# Patient Record
Sex: FEMALE | Race: WHITE | NOT HISPANIC OR LATINO | Employment: FULL TIME | URBAN - METROPOLITAN AREA
[De-identification: names, ages, dates, MRNs, and addresses within clinical notes are randomized per-mention and may not be internally consistent; named-entity substitution may affect disease eponyms.]

---

## 2017-01-18 ENCOUNTER — COMMUNICATION - HEALTHEAST (OUTPATIENT)
Dept: FAMILY MEDICINE | Facility: CLINIC | Age: 32
End: 2017-01-18

## 2017-04-14 ENCOUNTER — COMMUNICATION - HEALTHEAST (OUTPATIENT)
Dept: FAMILY MEDICINE | Facility: CLINIC | Age: 32
End: 2017-04-14

## 2017-04-17 ENCOUNTER — OFFICE VISIT - HEALTHEAST (OUTPATIENT)
Dept: FAMILY MEDICINE | Facility: CLINIC | Age: 32
End: 2017-04-17

## 2017-04-17 DIAGNOSIS — J20.9 ACUTE BRONCHITIS: ICD-10-CM

## 2017-04-17 ASSESSMENT — MIFFLIN-ST. JEOR: SCORE: 1719.2

## 2018-03-16 ENCOUNTER — COMMUNICATION - HEALTHEAST (OUTPATIENT)
Dept: FAMILY MEDICINE | Facility: CLINIC | Age: 33
End: 2018-03-16

## 2018-04-04 ENCOUNTER — OFFICE VISIT - HEALTHEAST (OUTPATIENT)
Dept: FAMILY MEDICINE | Facility: CLINIC | Age: 33
End: 2018-04-04

## 2018-04-04 DIAGNOSIS — G43.909 MIGRAINE HEADACHE: ICD-10-CM

## 2018-04-04 DIAGNOSIS — Z00.00 ROUTINE GENERAL MEDICAL EXAMINATION AT A HEALTH CARE FACILITY: ICD-10-CM

## 2018-04-04 DIAGNOSIS — E66.9 OBESITY: ICD-10-CM

## 2018-04-04 LAB
ALBUMIN SERPL-MCNC: 3.4 G/DL (ref 3.5–5)
ALP SERPL-CCNC: 83 U/L (ref 45–120)
ALT SERPL W P-5'-P-CCNC: 11 U/L (ref 0–45)
ANION GAP SERPL CALCULATED.3IONS-SCNC: 15 MMOL/L (ref 5–18)
AST SERPL W P-5'-P-CCNC: 15 U/L (ref 0–40)
BILIRUB SERPL-MCNC: 0.3 MG/DL (ref 0–1)
BUN SERPL-MCNC: 10 MG/DL (ref 8–22)
CALCIUM SERPL-MCNC: 9.7 MG/DL (ref 8.5–10.5)
CHLORIDE BLD-SCNC: 105 MMOL/L (ref 98–107)
CHOLEST SERPL-MCNC: 190 MG/DL
CO2 SERPL-SCNC: 18 MMOL/L (ref 22–31)
CREAT SERPL-MCNC: 0.62 MG/DL (ref 0.6–1.1)
FASTING STATUS PATIENT QL REPORTED: YES
GFR SERPL CREATININE-BSD FRML MDRD: >60 ML/MIN/1.73M2
GLUCOSE BLD-MCNC: 84 MG/DL (ref 70–125)
HDLC SERPL-MCNC: 61 MG/DL
LDLC SERPL CALC-MCNC: 78 MG/DL
POTASSIUM BLD-SCNC: 4.3 MMOL/L (ref 3.5–5)
PROT SERPL-MCNC: 7.1 G/DL (ref 6–8)
SODIUM SERPL-SCNC: 138 MMOL/L (ref 136–145)
TRIGL SERPL-MCNC: 256 MG/DL
TSH SERPL DL<=0.005 MIU/L-ACNC: 3.49 UIU/ML (ref 0.3–5)

## 2018-04-04 ASSESSMENT — MIFFLIN-ST. JEOR: SCORE: 1684.05

## 2018-04-05 LAB
HPV SOURCE: ABNORMAL
HUMAN PAPILLOMA VIRUS 16 DNA: NEGATIVE
HUMAN PAPILLOMA VIRUS 18 DNA: NEGATIVE
HUMAN PAPILLOMA VIRUS FINAL DIAGNOSIS: ABNORMAL
HUMAN PAPILLOMA VIRUS OTHER HR: POSITIVE
SPECIMEN DESCRIPTION: ABNORMAL

## 2018-04-24 ENCOUNTER — COMMUNICATION - HEALTHEAST (OUTPATIENT)
Dept: FAMILY MEDICINE | Facility: CLINIC | Age: 33
End: 2018-04-24

## 2018-06-06 ENCOUNTER — COMMUNICATION - HEALTHEAST (OUTPATIENT)
Dept: FAMILY MEDICINE | Facility: CLINIC | Age: 33
End: 2018-06-06

## 2018-09-06 ENCOUNTER — OFFICE VISIT (OUTPATIENT)
Dept: URGENT CARE | Facility: URGENT CARE | Age: 33
End: 2018-09-06
Payer: COMMERCIAL

## 2018-09-06 VITALS
TEMPERATURE: 98.6 F | HEART RATE: 68 BPM | BODY MASS INDEX: 39.5 KG/M2 | SYSTOLIC BLOOD PRESSURE: 122 MMHG | DIASTOLIC BLOOD PRESSURE: 60 MMHG | RESPIRATION RATE: 18 BRPM | WEIGHT: 223 LBS

## 2018-09-06 DIAGNOSIS — H61.22 EXCESSIVE CERUMEN IN LEFT EAR CANAL: ICD-10-CM

## 2018-09-06 DIAGNOSIS — H92.02 LEFT EAR PAIN: Primary | ICD-10-CM

## 2018-09-06 PROCEDURE — 69209 REMOVE IMPACTED EAR WAX UNI: CPT | Mod: LT | Performed by: NURSE PRACTITIONER

## 2018-09-06 PROCEDURE — 99202 OFFICE O/P NEW SF 15 MIN: CPT | Mod: 25 | Performed by: NURSE PRACTITIONER

## 2018-09-06 RX ORDER — DROSPIRENONE AND ETHINYL ESTRADIOL 0.02-3(28)
1 KIT ORAL
COMMUNITY
Start: 2018-06-07 | End: 2021-08-28

## 2018-09-06 NOTE — MR AVS SNAPSHOT
After Visit Summary   9/6/2018    Mei Kim    MRN: [id number]           Patient Information     Date Of Birth          1985        Visit Information        Provider Department      9/6/2018 7:15 PM Monika Valente APRN CNP [facility]        Today's Diagnoses     Left ear pain    -  1    Excessive cerumen in left ear canal          Care Instructions    Get Debrox or equivalent and use these drops tonight and tomorrow and then return tomorrow for ear irrigation.    Follow-up with your primary care provider next week and as needed.    Indications for emergent return to emergency department discussed with patient, who verbalized good understanding and agreement.  Patient understands the limitations of today's evaluation.         Impacted Earwax     Inner ear structures including ear canal and eardrum.     Impacted earwax is a buildup of the natural wax in the ear (cerumen). Impacted earwax is very common. It can cause symptoms such as hearing loss. It can also make it difficult for a doctor to examine your ear.  Understanding earwax  Tiny glands in your ear make substances that combine with dead skin cells to form earwax. Earwax helps protect your ear canal from water, dirt, infection, and injury. Over time, earwax travels from the inner part of your ear canal to the entrance of the canal. Then it falls away naturally. But in some cases, it can t travel to the entrance of the canal. This may be because of a health condition or objects put in the ear. With age, earwax tends to become harder and less fluid. Older adults are more likely to have problems with earwax buildup.  What causes impacted earwax?  Earwax can build up because of many health conditions. Some cause a physical blockage. Others cause too much earwax to be made. Health conditions that can cause earwax buildup include:    Use of cotton swabs to clean deep in the ear canal    Bony blockage  in the ear (osteoma or exostoses)    Infections, such as  infection of the outer ear (external otitis)    Skin disease, such as eczema    Autoimmune diseases, such as lupus    A narrowed ear canal from birth, chronic inflammation, or injury    Too much earwax because of injury    Too much earwax because of  water in the ear canal  Objects repeatedly placed in the ear can also cause impacted earwax. For example, putting cotton swabs in the ear may push the wax deeper into the ear. Over time, this may cause blockage. Hearing aids, swimming plugs, and swim molds can cause the same problem when used again and again.  In some cases, the cause of impacted earwax is not known.  Symptoms of impacted earwax  Excess earwax usually does not cause any symptoms, unless there is a large amount of buildup. Then it may cause symptoms such as:    Hearing loss    Earache    Sense of ear fullness    Itching in the ear    Odor from the ear    Ear drainage    Dizziness    Ringing in the ears    Cough  Treatment for impacted earwax  If you don t have symptoms, you may not need treatment. Often, the earwax goes away on its own with time. If you have symptoms, you may have one or more treatments such as:    Eardrops to soften the earwax. This helps it leave the ear over time.    Rinsing (irrigation) of the ear canal with water. This is done in a doctor s office.    Removal of the earwax with small tools. This is also done in a doctor s office.  In rare cases, some treatments for earwax removal may cause complications such as:    Infection of the outer ear (otitis external)    Earache    Short-term hearing loss    Dizziness    Water trapped in the ear canal    Hole in the eardrum    Ringing in the ears    Bleeding from the ear  Talk with your healthcare provider about which risks apply most to you.  Don t use these at home  Healthcare providers do not advise use of ear candles or ear vacuum kits. These methods are not shown to work and may  cause problems.   Preventing impacted earwax  You may not be able to prevent impacted earwax if you have a health condition that causes it, such as eczema. In other cases, you may be able to prevent earwax buildup by:    Using ear drops once a week    Having routine cleaning of the ear about every 6 months    Not using cotton swabs in the ear  When to call the healthcare provider  Call your healthcare provider if you have symptoms of impacted earwax. Also call right away if you have severe symptoms after earwax removal. These may include bleeding or severe ear pain.   Date Last Reviewed: 5/1/2017 2000-2017 [employer]. [address] [place], [place] [id number]. All rights reserved. This information is not intended as a substitute for professional medical care. Always follow your healthcare professional's instructions.          Earwax, Home Treatment    Everyone produces earwax from the lining of the ear canal. It serves to lubricate and protect the ear. The wax that forms in the canal naturally moves toward the outside of the ear and falls out. Sometimes the ear canal may contain too much wax. This can cause a blockage and loss of hearing. Directions are given below for home treatment.  Home care  If your doctor has advised you to remove a wax blockage yourself, follow these directions:    Unless a medicine was prescribed, you may use an over-the-counter product made for clearing earwax. These contain carbamide peroxide. Lie down with the blocked ear facing upward. Apply one dropper full of medicine and wait a few minutes. Grasp the outer ear and wiggle it to help the solution enter the canal.    Lean over a sink or basin with the blocked ear facing downward. Use a bulb syringe filled with warm (not hot or cold) water to rinse the ear several times. Use gentle pressure only.    If you are having trouble draining the water out of your ear canal, put a few drops of rubbing alcohol (isopropyl alcohol)  into the ear canal. This will help remove the remaining water.    Repeat this procedure once a day for up to three days, or until your hearing is back to normal. Do not use this treatment for more than three days in a row.  Don ts    Don t use cold water to rinse the ear. This will make you dizzy.    Don t perform this procedure if you have an ear infection.    Don t perform this procedure if you have a ruptured eardrum.    Don t use cotton swabs, matches, hairpins, keys, or other objects to  clean  the ear canal. This can cause infection of the ear canal or rupture the eardrum. Because of their size and shape, cotton swabs can push earwax deeper into the ear canal instead of removing it.  Follow-up care  Follow up with your health care provider if you are not improving after three cleaning attempts, or as advised.  When to seek medical advice  Call your health care provider right away if any of these occur:    Worsening ear pain    Fever of 101 F (38.3 C) or higher, or as directed by your health care provider    Hearing does not return to normal after three days of treatment    Fluid drainage or bleeding from the ear canal    Swelling, redness, or tenderness of the outer ear    Headache, neck pain, or stiff neck    3265-4730 [employer]. [address] [place] [address]. All rights reserved. This information is not intended as a substitute for professional medical care. Always follow your healthcare professional's instructions.        Earwax Removal    The ear canal makes earwax from the canal s lining. The ears make wax to lubricate and protect the ear canal. The ear canal is the tube that connects the middle ear to the outside of the ear. The wax protects the ear from bacteria, infection, and damage from water or trauma.  The wax that forms in the canal naturally moves toward the outside of the ear and falls out. In some cases, the ear may make too much wax. If the wax causes problems or  keeps the healthcare provider from seeing into the ear, the extra wax may be removed.  Too much wax can affect your hearing. It can cause itching. In rare cases, it can be painful. Earwax should not be removed unless it is causing a problem. You should not stick objects into your ear to remove wax unless told to do so by your healthcare provider.  Healthcare providers can remove earwax safely. It is important to stay still during the procedure to avoid damage to the ear canal. But removing earwax generally doesn t hurt. You will not usually need anesthesia or pain medicine when the provider removes the earwax.  A number of conditions lead to earwax buildup. These include some skin problems, a narrow ear canal, or ears that make too much earwax. Using cotton swabs in the canal pushes earwax deeper into the ear and contributes to the buildup of earwax.  Home care    The healthcare provider may recommend mineral oil or an over-the-counter eardrop to use at home to soften the earwax. Use these products only if the provider recommends them. Carefully follow the instructions given.    Don t use mineral oil or OTC eardrops if you might have an ear infection or a ruptured eardrum. Tell your healthcare provider right away if you have diabetes or an immune disorder.    Don t use cotton swabs in your ears. Cotton swabs may push wax deeper into the ear canal or damage the eardrum. Use cotton gauze or a wet washcloth  to gently remove wax on the outside of the ear and around the opening to the ear canal.    Don't use any probing device or object such as cotton-tipped swabs or markos pins to clean the inside of your ears.    Don t use ear candles to clean your ears. Candling can be dangerous. It can burn the ear canal. It can also make the condition worse instead of better.    Don t use cold water to rinse the ear. This will make you dizzy. If your provider tells you to rinse your ear, use only warm water or follow his or her  instructions.    Check the ear for signs of infection or irritation listed below under When to seek medical advice.  Steps for using eardrops  1. Warm the medicine bottle by rubbing it between your hands for a few minutes.  2. Lie down on your side, with the affected ear up.  3. Place the recommended number of drops in the ear. Wet a cotton ball with the medicine. Gently put the cotton ball into the ear opening.  Follow-up care  Follow up with your healthcare provider, or as directed.  When to seek medical advice  Call the provider right away if you have:    Ear pain that gets worse    Fever of 100.4F F (38 C) or higher, or as directed by your healthcare provider    Worsening wax buildup    Severe pain, dizziness, or nausea    Bleeding from the ear    Hearing problems    Signs of irritation from the eardrops, such as burning, stinging, or swelling and tenderness    Foul-smelling fluid draining from the ear    Swelling, redness, or tenderness of the outer ear    Headache, neck pain, or stiff neck  Date Last Reviewed: 6/1/2017 2000-2017 [employer]. [address] [place] [address]. All rights reserved. This information is not intended as a substitute for professional medical care. Always follow your healthcare professional's instructions.                Follow-ups after your visit        Follow-up notes from your care team     See patient instructions section of the AVS Return in about 1 day (around 9/7/2018) for Follow up with your primary care provider.      Who to contact     If you have questions or need follow up information about today's clinic visit or your schedule please contact [facility] directly at [phone].  Normal or non-critical lab and imaging results will be communicated to you by MyChart, letter or phone within 4 business days after the clinic has received the results. If you do not hear from us within 7 days, please contact the clinic  through [employer] or phone. If you have a critical or abnormal lab result, we will notify you by phone as soon as possible.  Submit refill requests through [employer] or call your pharmacy and they will forward the refill request to us. Please allow 3 business days for your refill to be completed.          Additional Information About Your Visit        Care EveryWhere ID     This is your Care EveryWhere ID. This could be used by other organizations to access your [facility] medical records  [id number]        Your Vitals Were     Pulse Temperature Respirations BMI (Body Mass Index)          68 98.6  F (37  C) (Tympanic) 18 39.5 kg/m2         Blood Pressure from Last 3 Encounters:   09/06/18 122/60   12/29/15 144/89   03/29/15 115/71    Weight from Last 3 Encounters:   09/06/18 223 lb (101.2 kg)   03/28/15 215 lb (97.5 kg)              Today, you had the following     No orders found for display       Primary Care Provider Office Phone # Fax #    Sade Krueger [phone]       [facility] [address] [place] [id number]        Equal Access to Services     [facility] CenterSUKH[facility] : Hadii aad ku hadasho Soomaali, waaxda luqadaha, qaybta kaalmada adeayahda, jeannine dove . So [facility] [phone].    ATENCIÓN: Si habla español, tiene a vela disposición servicios gratuitos de asistencia lingüística. [facility] [phone].    We comply with applicable federal civil rights laws and [place] laws. We do not discriminate on the basis of race, color, national origin, age, disability, sex, sexual orientation, or gender identity.            Thank you!     Thank you for choosing [facility]  for your care. Our goal is always to provide you with excellent care. Hearing back from our patients is one way we can continue to improve our services. Please take a few minutes to complete the written survey that you may receive in the mail after your visit with us.  Thank you!             Your Updated Medication List - Protect others around you: Learn how to safely use, store and throw away your medicines at www.disposemymeds.org.          This list is accurate as of 9/6/18  7:57 PM.  Always use your most recent med list.                   Brand Name Dispense Instructions for use Diagnosis    drospirenone-ethinyl estradiol 3-0.02 MG per tablet    GLENIS     Take 1 tablet by mouth        ibuprofen 600 MG tablet    ADVIL/MOTRIN    30 tablet    Take 1 tablet (600 mg) by mouth every 6 hours as needed for moderate pain

## 2018-09-07 ENCOUNTER — ALLIED HEALTH/NURSE VISIT (OUTPATIENT)
Dept: FAMILY MEDICINE | Facility: CLINIC | Age: 33
End: 2018-09-07
Payer: COMMERCIAL

## 2018-09-07 DIAGNOSIS — H61.22 IMPACTED CERUMEN OF LEFT EAR: Primary | ICD-10-CM

## 2018-09-07 PROCEDURE — 99207 ZZC NO CHARGE NURSE ONLY: CPT

## 2018-09-07 NOTE — PATIENT INSTRUCTIONS
Get Debrox or equivalent and use these drops tonight and tomorrow and then return tomorrow for ear irrigation.    Follow-up with your primary care provider next week and as needed.    Indications for emergent return to emergency department discussed with patient, who verbalized good understanding and agreement.  Patient understands the limitations of today's evaluation.         Impacted Earwax     Inner ear structures including ear canal and eardrum.     Impacted earwax is a buildup of the natural wax in the ear (cerumen). Impacted earwax is very common. It can cause symptoms such as hearing loss. It can also make it difficult for a doctor to examine your ear.  Understanding earwax  Tiny glands in your ear make substances that combine with dead skin cells to form earwax. Earwax helps protect your ear canal from water, dirt, infection, and injury. Over time, earwax travels from the inner part of your ear canal to the entrance of the canal. Then it falls away naturally. But in some cases, it can t travel to the entrance of the canal. This may be because of a health condition or objects put in the ear. With age, earwax tends to become harder and less fluid. Older adults are more likely to have problems with earwax buildup.  What causes impacted earwax?  Earwax can build up because of many health conditions. Some cause a physical blockage. Others cause too much earwax to be made. Health conditions that can cause earwax buildup include:    Use of cotton swabs to clean deep in the ear canal    Bony blockage in the ear (osteoma or exostoses)    Infections, such as  infection of the outer ear (external otitis)    Skin disease, such as eczema    Autoimmune diseases, such as lupus    A narrowed ear canal from birth, chronic inflammation, or injury    Too much earwax because of injury    Too much earwax because of  water in the ear canal  Objects repeatedly placed in the ear can also cause impacted earwax. For example, putting  cotton swabs in the ear may push the wax deeper into the ear. Over time, this may cause blockage. Hearing aids, swimming plugs, and swim molds can cause the same problem when used again and again.  In some cases, the cause of impacted earwax is not known.  Symptoms of impacted earwax  Excess earwax usually does not cause any symptoms, unless there is a large amount of buildup. Then it may cause symptoms such as:    Hearing loss    Earache    Sense of ear fullness    Itching in the ear    Odor from the ear    Ear drainage    Dizziness    Ringing in the ears    Cough  Treatment for impacted earwax  If you don t have symptoms, you may not need treatment. Often, the earwax goes away on its own with time. If you have symptoms, you may have one or more treatments such as:    Eardrops to soften the earwax. This helps it leave the ear over time.    Rinsing (irrigation) of the ear canal with water. This is done in a doctor s office.    Removal of the earwax with small tools. This is also done in a doctor s office.  In rare cases, some treatments for earwax removal may cause complications such as:    Infection of the outer ear (otitis external)    Earache    Short-term hearing loss    Dizziness    Water trapped in the ear canal    Hole in the eardrum    Ringing in the ears    Bleeding from the ear  Talk with your healthcare provider about which risks apply most to you.  Don t use these at home  Healthcare providers do not advise use of ear candles or ear vacuum kits. These methods are not shown to work and may cause problems.   Preventing impacted earwax  You may not be able to prevent impacted earwax if you have a health condition that causes it, such as eczema. In other cases, you may be able to prevent earwax buildup by:    Using ear drops once a week    Having routine cleaning of the ear about every 6 months    Not using cotton swabs in the ear  When to call the healthcare provider  Call your healthcare provider if you  have symptoms of impacted earwax. Also call right away if you have severe symptoms after earwax removal. These may include bleeding or severe ear pain.   Date Last Reviewed: 5/1/2017 2000-2017 [employer]. [address] [place] [address], [place] [address]. All rights reserved. This information is not intended as a substitute for professional medical care. Always follow your healthcare professional's instructions.          Earwax, Home Treatment    Everyone produces earwax from the lining of the ear canal. It serves to lubricate and protect the ear. The wax that forms in the canal naturally moves toward the outside of the ear and falls out. Sometimes the ear canal may contain too much wax. This can cause a blockage and loss of hearing. Directions are given below for home treatment.  Home care  If your doctor has advised you to remove a wax blockage yourself, follow these directions:    Unless a medicine was prescribed, you may use an over-the-counter product made for clearing earwax. These contain carbamide peroxide. Lie down with the blocked ear facing upward. Apply one dropper full of medicine and wait a few minutes. Grasp the outer ear and wiggle it to help the solution enter the canal.    Lean over a sink or basin with the blocked ear facing downward. Use a bulb syringe filled with warm (not hot or cold) water to rinse the ear several times. Use gentle pressure only.    If you are having trouble draining the water out of your ear canal, put a few drops of rubbing alcohol (isopropyl alcohol) into the ear canal. This will help remove the remaining water.    Repeat this procedure once a day for up to three days, or until your hearing is back to normal. Do not use this treatment for more than three days in a row.  Don ts    Don t use cold water to rinse the ear. This will make you dizzy.    Don t perform this procedure if you have an ear infection.    Don t perform this procedure if you have a ruptured  eardrum.    Don t use cotton swabs, matches, hairpins, keys, or other objects to  clean  the ear canal. This can cause infection of the ear canal or rupture the eardrum. Because of their size and shape, cotton swabs can push earwax deeper into the ear canal instead of removing it.  Follow-up care  Follow up with your health care provider if you are not improving after three cleaning attempts, or as advised.  When to seek medical advice  Call your health care provider right away if any of these occur:    Worsening ear pain    Fever of 101 F (38.3 C) or higher, or as directed by your health care provider    Hearing does not return to normal after three days of treatment    Fluid drainage or bleeding from the ear canal    Swelling, redness, or tenderness of the outer ear    Headache, neck pain, or stiff neck    0093-3043 [employer]. [address] [place] [address]. All rights reserved. This information is not intended as a substitute for professional medical care. Always follow your healthcare professional's instructions.        Earwax Removal    The ear canal makes earwax from the canal s lining. The ears make wax to lubricate and protect the ear canal. The ear canal is the tube that connects the middle ear to the outside of the ear. The wax protects the ear from bacteria, infection, and damage from water or trauma.  The wax that forms in the canal naturally moves toward the outside of the ear and falls out. In some cases, the ear may make too much wax. If the wax causes problems or keeps the healthcare provider from seeing into the ear, the extra wax may be removed.  Too much wax can affect your hearing. It can cause itching. In rare cases, it can be painful. Earwax should not be removed unless it is causing a problem. You should not stick objects into your ear to remove wax unless told to do so by your healthcare provider.  Healthcare providers can remove earwax safely. It is important to  stay still during the procedure to avoid damage to the ear canal. But removing earwax generally doesn t hurt. You will not usually need anesthesia or pain medicine when the provider removes the earwax.  A number of conditions lead to earwax buildup. These include some skin problems, a narrow ear canal, or ears that make too much earwax. Using cotton swabs in the canal pushes earwax deeper into the ear and contributes to the buildup of earwax.  Home care    The healthcare provider may recommend mineral oil or an over-the-counter eardrop to use at home to soften the earwax. Use these products only if the provider recommends them. Carefully follow the instructions given.    Don t use mineral oil or OTC eardrops if you might have an ear infection or a ruptured eardrum. Tell your healthcare provider right away if you have diabetes or an immune disorder.    Don t use cotton swabs in your ears. Cotton swabs may push wax deeper into the ear canal or damage the eardrum. Use cotton gauze or a wet washcloth  to gently remove wax on the outside of the ear and around the opening to the ear canal.    Don't use any probing device or object such as cotton-tipped swabs or markos pins to clean the inside of your ears.    Don t use ear candles to clean your ears. Candling can be dangerous. It can burn the ear canal. It can also make the condition worse instead of better.    Don t use cold water to rinse the ear. This will make you dizzy. If your provider tells you to rinse your ear, use only warm water or follow his or her instructions.    Check the ear for signs of infection or irritation listed below under When to seek medical advice.  Steps for using eardrops  1. Warm the medicine bottle by rubbing it between your hands for a few minutes.  2. Lie down on your side, with the affected ear up.  3. Place the recommended number of drops in the ear. Wet a cotton ball with the medicine. Gently put the cotton ball into the ear  opening.  Follow-up care  Follow up with your healthcare provider, or as directed.  When to seek medical advice  Call the provider right away if you have:    Ear pain that gets worse    Fever of 100.4F F (38 C) or higher, or as directed by your healthcare provider    Worsening wax buildup    Severe pain, dizziness, or nausea    Bleeding from the ear    Hearing problems    Signs of irritation from the eardrops, such as burning, stinging, or swelling and tenderness    Foul-smelling fluid draining from the ear    Swelling, redness, or tenderness of the outer ear    Headache, neck pain, or stiff neck  Date Last Reviewed: 6/1/2017 2000-2017 [employer]. [address] [place] [id number]. All rights reserved. This information is not intended as a substitute for professional medical care. Always follow your healthcare professional's instructions.

## 2018-09-07 NOTE — PROGRESS NOTES
SUBJECTIVE:   Mei Kim is a 33 year old female who presents to clinic today for the following health issues:  Chief Complaint   Patient presents with     Otalgia     Left ear pain for couple days.  Feels like something is in it.                  Problem list and histories reviewed & adjusted, as indicated.  Additional history: as documented    There is no problem list on file for this patient.    History reviewed. No pertinent surgical history.    Social History   Substance Use Topics     Smoking status: Never Smoker     Smokeless tobacco: Never Used     Alcohol use Yes     History reviewed. No pertinent family history.      Current Outpatient Prescriptions   Medication Sig Dispense Refill     drospirenone-ethinyl estradiol (GLENIS) 3-0.02 MG per tablet Take 1 tablet by mouth       ibuprofen (ADVIL,MOTRIN) 600 MG tablet Take 1 tablet (600 mg) by mouth every 6 hours as needed for moderate pain (Patient not taking: Reported on 9/6/2018) 30 tablet 1     Allergies   Allergen Reactions     Sulfa Drugs      Labs reviewed in EPIC    Reviewed and updated as needed this visit by clinical staff  Tobacco  Allergies  Meds  Problems  Med Hx  Surg Hx  Fam Hx  Soc Hx        Reviewed and updated as needed this visit by Provider  Allergies  Meds  Problems         ROS:  Constitutional, HEENT, cardiovascular, pulmonary, GI, , musculoskeletal, neuro, skin, endocrine and psych systems are negative, except as otherwise noted.    OBJECTIVE:     /60 (BP Location: Right arm, Cuff Size: Adult Large)  Pulse 68  Temp 98.6  F (37  C) (Tympanic)  Resp 18  Wt 223 lb (101.2 kg)  BMI 39.5 kg/m2  Body mass index is 39.5 kg/(m^2).   GENERAL: healthy, alert and no distress  EYES: Eyes grossly normal to inspection, PERRL and conjunctivae and sclerae normal  HENT: ear canals normal on right and occluded with cerumen on left. Irrigation attempted but unsuccessful. Need to put in ear was softener and return and TM's normal  on right and unable to see on left, nose and mouth without ulcers or lesions  NECK: no adenopathy, supple with full ROM  RESP: lungs clear to auscultation - no rales, rhonchi or wheezes  CV: regular rate and rhythm, normal S1 S2, no S3 or S4, no murmur, click or rub, no peripheral edema   ABDOMEN: soft, nontender, no hepatosplenomegaly, no masses and bowel sounds normal  MS: no gross musculoskeletal defects noted, no edema  No rash    Diagnostic Test Results:  No results found for this or any previous visit (from the past 24 hour(s)).    ASSESSMENT/PLAN:     Problem List Items Addressed This Visit     None      Visit Diagnoses     Left ear pain    -  Primary    Excessive cerumen in left ear canal                   Patient Instructions     Get Debrox or equivalent and use these drops tonight and tomorrow and then return tomorrow for ear irrigation.    Follow-up with your primary care provider next week and as needed.    Indications for emergent return to emergency department discussed with patient, who verbalized good understanding and agreement.  Patient understands the limitations of today's evaluation.         Impacted Earwax     Inner ear structures including ear canal and eardrum.     Impacted earwax is a buildup of the natural wax in the ear (cerumen). Impacted earwax is very common. It can cause symptoms such as hearing loss. It can also make it difficult for a doctor to examine your ear.  Understanding earwax  Tiny glands in your ear make substances that combine with dead skin cells to form earwax. Earwax helps protect your ear canal from water, dirt, infection, and injury. Over time, earwax travels from the inner part of your ear canal to the entrance of the canal. Then it falls away naturally. But in some cases, it can t travel to the entrance of the canal. This may be because of a health condition or objects put in the ear. With age, earwax tends to become harder and less fluid. Older adults are more  likely to have problems with earwax buildup.  What causes impacted earwax?  Earwax can build up because of many health conditions. Some cause a physical blockage. Others cause too much earwax to be made. Health conditions that can cause earwax buildup include:    Use of cotton swabs to clean deep in the ear canal    Bony blockage in the ear (osteoma or exostoses)    Infections, such as  infection of the outer ear (external otitis)    Skin disease, such as eczema    Autoimmune diseases, such as lupus    A narrowed ear canal from birth, chronic inflammation, or injury    Too much earwax because of injury    Too much earwax because of  water in the ear canal  Objects repeatedly placed in the ear can also cause impacted earwax. For example, putting cotton swabs in the ear may push the wax deeper into the ear. Over time, this may cause blockage. Hearing aids, swimming plugs, and swim molds can cause the same problem when used again and again.  In some cases, the cause of impacted earwax is not known.  Symptoms of impacted earwax  Excess earwax usually does not cause any symptoms, unless there is a large amount of buildup. Then it may cause symptoms such as:    Hearing loss    Earache    Sense of ear fullness    Itching in the ear    Odor from the ear    Ear drainage    Dizziness    Ringing in the ears    Cough  Treatment for impacted earwax  If you don t have symptoms, you may not need treatment. Often, the earwax goes away on its own with time. If you have symptoms, you may have one or more treatments such as:    Eardrops to soften the earwax. This helps it leave the ear over time.    Rinsing (irrigation) of the ear canal with water. This is done in a doctor s office.    Removal of the earwax with small tools. This is also done in a doctor s office.  In rare cases, some treatments for earwax removal may cause complications such as:    Infection of the outer ear (otitis external)    Earache    Short-term hearing  loss    Dizziness    Water trapped in the ear canal    Hole in the eardrum    Ringing in the ears    Bleeding from the ear  Talk with your healthcare provider about which risks apply most to you.  Don t use these at home  Healthcare providers do not advise use of ear candles or ear vacuum kits. These methods are not shown to work and may cause problems.   Preventing impacted earwax  You may not be able to prevent impacted earwax if you have a health condition that causes it, such as eczema. In other cases, you may be able to prevent earwax buildup by:    Using ear drops once a week    Having routine cleaning of the ear about every 6 months    Not using cotton swabs in the ear  When to call the healthcare provider  Call your healthcare provider if you have symptoms of impacted earwax. Also call right away if you have severe symptoms after earwax removal. These may include bleeding or severe ear pain.   Date Last Reviewed: 5/1/2017 2000-2017 [employer]. [address] [place] [address]. All rights reserved. This information is not intended as a substitute for professional medical care. Always follow your healthcare professional's instructions.          Earwax, Home Treatment    Everyone produces earwax from the lining of the ear canal. It serves to lubricate and protect the ear. The wax that forms in the canal naturally moves toward the outside of the ear and falls out. Sometimes the ear canal may contain too much wax. This can cause a blockage and loss of hearing. Directions are given below for home treatment.  Home care  If your doctor has advised you to remove a wax blockage yourself, follow these directions:    Unless a medicine was prescribed, you may use an over-the-counter product made for clearing earwax. These contain carbamide peroxide. Lie down with the blocked ear facing upward. Apply one dropper full of medicine and wait a few minutes. Grasp the outer ear and wiggle it to  help the solution enter the canal.    Lean over a sink or basin with the blocked ear facing downward. Use a bulb syringe filled with warm (not hot or cold) water to rinse the ear several times. Use gentle pressure only.    If you are having trouble draining the water out of your ear canal, put a few drops of rubbing alcohol (isopropyl alcohol) into the ear canal. This will help remove the remaining water.    Repeat this procedure once a day for up to three days, or until your hearing is back to normal. Do not use this treatment for more than three days in a row.  Don ts    Don t use cold water to rinse the ear. This will make you dizzy.    Don t perform this procedure if you have an ear infection.    Don t perform this procedure if you have a ruptured eardrum.    Don t use cotton swabs, matches, hairpins, keys, or other objects to  clean  the ear canal. This can cause infection of the ear canal or rupture the eardrum. Because of their size and shape, cotton swabs can push earwax deeper into the ear canal instead of removing it.  Follow-up care  Follow up with your health care provider if you are not improving after three cleaning attempts, or as advised.  When to seek medical advice  Call your health care provider right away if any of these occur:    Worsening ear pain    Fever of 101 F (38.3 C) or higher, or as directed by your health care provider    Hearing does not return to normal after three days of treatment    Fluid drainage or bleeding from the ear canal    Swelling, redness, or tenderness of the outer ear    Headache, neck pain, or stiff neck    3380-3226 [employer]. [address] [place] [address], [place] [address]. All rights reserved. This information is not intended as a substitute for professional medical care. Always follow your healthcare professional's instructions.        Earwax Removal    The ear canal makes earwax from the canal s lining. The ears make wax to lubricate and protect the ear  canal. The ear canal is the tube that connects the middle ear to the outside of the ear. The wax protects the ear from bacteria, infection, and damage from water or trauma.  The wax that forms in the canal naturally moves toward the outside of the ear and falls out. In some cases, the ear may make too much wax. If the wax causes problems or keeps the healthcare provider from seeing into the ear, the extra wax may be removed.  Too much wax can affect your hearing. It can cause itching. In rare cases, it can be painful. Earwax should not be removed unless it is causing a problem. You should not stick objects into your ear to remove wax unless told to do so by your healthcare provider.  Healthcare providers can remove earwax safely. It is important to stay still during the procedure to avoid damage to the ear canal. But removing earwax generally doesn t hurt. You will not usually need anesthesia or pain medicine when the provider removes the earwax.  A number of conditions lead to earwax buildup. These include some skin problems, a narrow ear canal, or ears that make too much earwax. Using cotton swabs in the canal pushes earwax deeper into the ear and contributes to the buildup of earwax.  Home care    The healthcare provider may recommend mineral oil or an over-the-counter eardrop to use at home to soften the earwax. Use these products only if the provider recommends them. Carefully follow the instructions given.    Don t use mineral oil or OTC eardrops if you might have an ear infection or a ruptured eardrum. Tell your healthcare provider right away if you have diabetes or an immune disorder.    Don t use cotton swabs in your ears. Cotton swabs may push wax deeper into the ear canal or damage the eardrum. Use cotton gauze or a wet washcloth  to gently remove wax on the outside of the ear and around the opening to the ear canal.    Don't use any probing device or object such as cotton-tipped swabs or markos pins to  clean the inside of your ears.    Don t use ear candles to clean your ears. Candling can be dangerous. It can burn the ear canal. It can also make the condition worse instead of better.    Don t use cold water to rinse the ear. This will make you dizzy. If your provider tells you to rinse your ear, use only warm water or follow his or her instructions.    Check the ear for signs of infection or irritation listed below under When to seek medical advice.  Steps for using eardrops  1. Warm the medicine bottle by rubbing it between your hands for a few minutes.  2. Lie down on your side, with the affected ear up.  3. Place the recommended number of drops in the ear. Wet a cotton ball with the medicine. Gently put the cotton ball into the ear opening.  Follow-up care  Follow up with your healthcare provider, or as directed.  When to seek medical advice  Call the provider right away if you have:    Ear pain that gets worse    Fever of 100.4F F (38 C) or higher, or as directed by your healthcare provider    Worsening wax buildup    Severe pain, dizziness, or nausea    Bleeding from the ear    Hearing problems    Signs of irritation from the eardrops, such as burning, stinging, or swelling and tenderness    Foul-smelling fluid draining from the ear    Swelling, redness, or tenderness of the outer ear    Headache, neck pain, or stiff neck  Date Last Reviewed: 6/1/2017 2000-2017 [employer]. [address] [place], [place] [id number]. All rights reserved. This information is not intended as a substitute for professional medical care. Always follow your healthcare professional's instructions.            ANGEL Sarmiento [facility]  .

## 2018-09-07 NOTE — PROGRESS NOTES
Patient identified using two patient identifiers.  Ear exam showing wax occlusion completed by provider.  Solution: warm water was placed in the left ear(s) via irrigation tool: elephant ear.  Vaishnavi COVARRUBIAS CMA

## 2018-09-07 NOTE — NURSING NOTE
"Chief Complaint   Patient presents with     Otalgia     Left ear pain for couple days.  Feels like something is in it.        Initial /60 (BP Location: Right arm, Cuff Size: Adult Large)  Pulse 68  Temp 98.6  F (37  C) (Tympanic)  Resp 18  Wt 223 lb (101.2 kg)  BMI 39.5 kg/m2 Estimated body mass index is 39.5 kg/(m^2) as calculated from the following:    Height as of 3/28/15: 5' 3\" (1.6 m).    Weight as of this encounter: 223 lb (101.2 kg).      Health Maintenance that is potentially due pending provider review:  NONE    n/a    Is there anyone who you would like to be able to receive your results? Not Applicable  If yes have patient fill out DUNG    Jo Gonzales M.A.      "

## 2018-09-07 NOTE — MR AVS SNAPSHOT
After Visit Summary   9/7/2018    Mei Kim    MRN: [id number]           Patient Information     Date Of Birth          1985        Visit Information        Provider Department      9/7/2018 11:00 AM [place] RODOLFO MEJIAS/LPN [facility]        Today's Diagnoses     Impacted cerumen of left ear    -  1       Follow-ups after your visit        Who to contact     If you have questions or need follow up information about today's clinic visit or your schedule please contact [facility] directly at [phone].  Normal or non-critical lab and imaging results will be communicated to you by MyChart, letter or phone within 4 business days after the clinic has received the results. If you do not hear from us within 7 days, please contact the clinic through MyChart or phone. If you have a critical or abnormal lab result, we will notify you by phone as soon as possible.  Submit refill requests through [employer] or call your pharmacy and they will forward the refill request to us. Please allow 3 business days for your refill to be completed.          Additional Information About Your Visit        Care EveryWhere ID     This is your Care EveryWhere ID. This could be used by other organizations to access your [facility] medical records  [id number]         Blood Pressure from Last 3 Encounters:   09/06/18 122/60   12/29/15 144/89   03/29/15 115/71    Weight from Last 3 Encounters:   09/06/18 223 lb (101.2 kg)   03/28/15 215 lb (97.5 kg)              Today, you had the following     No orders found for display       Primary Care Provider Office Phone # Fax #    Sade Krueger [phone]       [facility] [address] [place] [id number]        Equal Access to Services     YESENIA LOVETT : Mak Khanna, ander souza, jeannine bass. So [facility] [phone].    ATENCIÓN: Anjana gaming  español, tiene a vela disposición servicios gratuitos de asistencia lingüística. Torsten romano [phone].    We comply with applicable federal civil rights laws and [place] laws. We do not discriminate on the basis of race, color, national origin, age, disability, sex, sexual orientation, or gender identity.            Thank you!     Thank you for choosing [facility]  for your care. Our goal is always to provide you with excellent care. Hearing back from our patients is one way we can continue to improve our services. Please take a few minutes to complete the written survey that you may receive in the mail after your visit with us. Thank you!             Your Updated Medication List - Protect others around you: Learn how to safely use, store and throw away your medicines at www.disposemymeds.org.          This list is accurate as of 9/7/18  1:23 PM.  Always use your most recent med list.                   Brand Name Dispense Instructions for use Diagnosis    drospirenone-ethinyl estradiol 3-0.02 MG per tablet    GLENIS     Take 1 tablet by mouth        ibuprofen 600 MG tablet    ADVIL/MOTRIN    30 tablet    Take 1 tablet (600 mg) by mouth every 6 hours as needed for moderate pain

## 2019-05-13 ENCOUNTER — COMMUNICATION - HEALTHEAST (OUTPATIENT)
Dept: FAMILY MEDICINE | Facility: CLINIC | Age: 34
End: 2019-05-13

## 2019-05-30 ENCOUNTER — OFFICE VISIT - HEALTHEAST (OUTPATIENT)
Dept: FAMILY MEDICINE | Facility: CLINIC | Age: 34
End: 2019-05-30

## 2019-05-30 DIAGNOSIS — Z00.00 ROUTINE GENERAL MEDICAL EXAMINATION AT A HEALTH CARE FACILITY: ICD-10-CM

## 2019-05-30 DIAGNOSIS — E66.812 CLASS 2 OBESITY WITHOUT SERIOUS COMORBIDITY WITH BODY MASS INDEX (BMI) OF 38.0 TO 38.9 IN ADULT, UNSPECIFIED OBESITY TYPE: ICD-10-CM

## 2019-05-30 DIAGNOSIS — G43.909 MIGRAINE WITHOUT STATUS MIGRAINOSUS, NOT INTRACTABLE, UNSPECIFIED MIGRAINE TYPE: ICD-10-CM

## 2019-05-30 DIAGNOSIS — N63.23 LUMP IN LOWER OUTER QUADRANT OF LEFT BREAST: ICD-10-CM

## 2019-05-30 LAB
CHOLEST SERPL-MCNC: 196 MG/DL
FASTING STATUS PATIENT QL REPORTED: YES
HDLC SERPL-MCNC: 57 MG/DL
LDLC SERPL CALC-MCNC: 88 MG/DL
TRIGL SERPL-MCNC: 253 MG/DL

## 2019-05-30 ASSESSMENT — MIFFLIN-ST. JEOR: SCORE: 1691.19

## 2019-05-31 ENCOUNTER — HOSPITAL ENCOUNTER (OUTPATIENT)
Dept: MAMMOGRAPHY | Facility: CLINIC | Age: 34
Discharge: HOME | End: 2019-05-31
Attending: FAMILY MEDICINE

## 2019-05-31 DIAGNOSIS — N63.23 LUMP IN LOWER OUTER QUADRANT OF LEFT BREAST: ICD-10-CM

## 2019-06-11 LAB
BKR LAB AP ABNORMAL BLEEDING: NO
BKR LAB AP BIRTH CONTROL/HORMONES: NORMAL
BKR LAB AP CERVICAL APPEARANCE: NORMAL
BKR LAB AP GYN ADEQUACY: NORMAL
BKR LAB AP GYN INTERPRETATION: NORMAL
BKR LAB AP HPV REFLEX: NORMAL
BKR LAB AP LMP: NORMAL
BKR LAB AP PATIENT STATUS: NORMAL
BKR LAB AP PREVIOUS ABNORMAL: NORMAL
BKR LAB AP PREVIOUS NORMAL: NORMAL
HIGH RISK?: YES
PATH REPORT.COMMENTS IMP SPEC: NORMAL
RESULT FLAG (HE HISTORICAL CONVERSION): NORMAL

## 2019-08-09 ENCOUNTER — COMMUNICATION - HEALTHEAST (OUTPATIENT)
Dept: FAMILY MEDICINE | Facility: CLINIC | Age: 34
End: 2019-08-09

## 2020-04-16 ENCOUNTER — COMMUNICATION - HEALTHEAST (OUTPATIENT)
Dept: FAMILY MEDICINE | Facility: CLINIC | Age: 35
End: 2020-04-16

## 2020-04-16 DIAGNOSIS — Z78.9 USES BIRTH CONTROL: ICD-10-CM

## 2020-07-10 ENCOUNTER — COMMUNICATION - HEALTHEAST (OUTPATIENT)
Dept: FAMILY MEDICINE | Facility: CLINIC | Age: 35
End: 2020-07-10

## 2020-07-10 DIAGNOSIS — Z78.9 USES BIRTH CONTROL: ICD-10-CM

## 2020-11-04 ENCOUNTER — VIRTUAL VISIT (OUTPATIENT)
Dept: FAMILY MEDICINE | Facility: OTHER | Age: 35
End: 2020-11-04
Payer: COMMERCIAL

## 2020-11-04 DIAGNOSIS — Z20.822 SUSPECTED COVID-19 VIRUS INFECTION: Primary | ICD-10-CM

## 2020-11-04 PROCEDURE — 99421 OL DIG E/M SVC 5-10 MIN: CPT | Performed by: PHYSICIAN ASSISTANT

## 2020-11-04 NOTE — PROGRESS NOTES
"Date: 2020 07:03:22  Clinician: Raiza Tim  Clinician NPI: [id number]  Patient: Mei Kim  Patient : 1985  Patient Address: [address] [place], [place] [address]  Patient Phone: [phone]  Visit Protocol: URI  Patient Summary:  Mei is a 35 year old ( : 1985 ) female who initiated a [facility] Visit for COVID-19 (Coronavirus) evaluation and screening. When asked the question \"Please sign me up to receive news, health information and promotions from [facility].\", Mei responded \"No\".    Mei states her symptoms started gradually 3-4 days ago.   Her symptoms consist of rhinitis, myalgia, malaise, a sore throat, a headache, and a cough.   Symptom details     Nasal secretions: The color of her mucus is clear.    Cough: Mei coughs a few times an hour and her cough is not more bothersome at night. Phlegm does not come into her throat when she coughs. She believes her cough is caused by post-nasal drip.     Sore throat: Mei reports having mild throat pain (1-3 on a 10 point pain scale), does not have exudate on her tonsils, and can swallow liquids. She is not sure if the lymph nodes in her neck are enlarged. A rash has not appeared on the skin since the sore throat started.     Headache: She states the headache is moderate (4-6 on a 10 point pain scale).      Mei denies having vomiting, facial pain or pressure, chills, teeth pain, ageusia, diarrhea, ear pain, wheezing, fever, nasal congestion, nausea, and anosmia. She also denies taking antibiotic medication in the past month, having recent facial or sinus surgery in the past 60 days, and double sickening (worsening symptoms after initial improvement). She is not experiencing dyspnea.   Precipitating events  Within the past week, Mei has not been exposed to someone with strep throat. She has not recently been exposed to someone with influenza. Mei has not been in close contact with any high risk individuals.   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 She does not provide direct patient care. In the past 14 days, Mei has worked or volunteered at a hospital or a clinic. 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   Mei has not had a close contact with a laboratory-confirmed COVID-19 patient within 14 days of symptom onset.    Since December 2019, Mei has been tested for COVID-19 and has had upper respiratory infection (URI) or influenza-like illness.      Result of COVID-19 test: Negative     Date of her COVID-19 test: 09/29/2020     Date(s) of previous URI or influenza-like illness (free-text): January 3rd-January 12th     Symptoms Mei experienced during previous URI or influenza-like illness as reported by the patient (free-text): Fever, body aches, chills, stuffy nose, cough, wheezing, sore throat        Pertinent medical history  Mei typically gets a yeast infection when she takes antibiotics. She has used fluconazole (Diflucan) to treat previous yeast infections. 1 dose of fluconazole (Diflucan) has typically been sufficient for symptoms to resolve in the past.   Mei needs a return to work/school note.   Weight: 220 lbs   Mei does not smoke or use smokeless tobacco.   She denies pregnancy and denies breastfeeding. She has menstruated in the past month.   Weight: 220 lbs    MEDICATIONS: Adeline (28) oral, ALLERGIES: Sulfa (Sulfonamide Antibiotics)  Clinician Response:  Dear Mei,  Your health is our priority. Based on the information you have provided, it is possible that you may have some type of viral infection.  Please read the full treatment plan and see my recommendations below.  Medication information  Because you have a viral infection, antibiotics will not  help you get better. Treating a viral infection with antibiotics could actually make you feel worse.  Self care  Steps you can take to be as comfortable as possible:     Rest.    Drink plenty of fluids.    Take a warm shower to loosen congestion    Use a cool-mist humidifier.    Use throat lozenges.    Suck on frozen items such as popsicles.    Drink hot tea with lemon and honey.    Gargle with warm salt water (1/4 teaspoon of salt per 8 ounce glass of water).    Take a spoonful of honey to reduce your cough.     Additional treatment plan   Your symptoms show that you may have coronavirus (COVID-19). This illness can cause fever, cough and trouble breathing. Many people get a mild case and get better on their own. Some people can get very sick.  Based on the symptoms you have shared, I would like you to be re-checked in 2 to 3 days. Please call your family clinic to set up a video or phone visit.  Will I be tested for COVID-19?  We would like to test you for this virus.   Please call [phone] to schedule your visit. Explain that you were referred by [facility] to have a COVID-19 test. Be ready to share your [facility] visit ID number.   * If you need to schedule in [place] or [facility] please call [phone] or for [facility] employees please call [phone].    The following will serve as your written order for this COVID Test, ordered by me, for the indication of suspected COVID [Z20.828]: The test will be ordered in [employer], our electronic health record, after you are scheduled. It will show as ordered and authorized by Leopoldo Schumacher MD.  Order: COVID-19 (Coronavirus) PCR for SYMPTOMATIC testing from [facility].   1.When it's time for your COVID test:   Stay at least 6 feet away from others. (If someone will drive you to your test, stay in the backseat, as far away from the  as you can.)   Cover your mouth and nose with a mask, tissue or washcloth.  Go straight to the testing site. Don't make any stops on the  "way there or back.      2.Starting now: Stay home and away from others (self-isolate) until:   You've had no fever---and no medicine that reduces fever---for one full day (24 hours). And...   Your other symptoms have gotten better. For example, your cough or breathing has improved. And...   At least 10 days have passed since your symptoms started.       During this time, don't leave the house except for testing or medical care.   Stay in your own room, even for meals. Use your own bathroom if you can.   Stay away from others in your home. No hugging, kissing or shaking hands. No visitors.  Don't go to work, school or anywhere else.    Clean \"high touch\" surfaces often (doorknobs, counters, handles, etc.). Use a household cleaning spray or wipes. You'll find a full list of  on the EPA website: www.epa.gov/pesticide-registration/list-n-disinfectants-use-against-sars-cov-2.   Cover your mouth and nose with a mask, tissue or washcloth to avoid spreading germs.  Wash your hands and face often. Use soap and water.  Caregivers in these groups are at risk for severe illness due to COVID-19:  o People 65 years and older  o People who live in a nursing home or long-term care facility  o People with chronic disease (lung, heart, cancer, diabetes, kidney, liver, immunologic)   o People who have a weakened immune system, including those who:   Are in cancer treatment  Take medicine that weakens the immune system, such as corticosteroids  Had a bone marrow or organ transplant  Have an immune deficiency  Have poorly controlled HIV or AIDS  Are obese (body mass index of 40 or higher)  Smoke regularly   o Caregivers should wear gloves while washing dishes, handling laundry and cleaning bedrooms and bathrooms.  o Use caution when washing and drying laundry: Don't shake dirty laundry, and use the warmest water setting that you can.  o For more tips, go to www.cdc.gov/coronavirus/2019-ncov/downloads/10Things.pdf.      How can I " take care of myself?   Get lots of rest. Drink extra fluids (unless a doctor has told you not to)   Take Tylenol (acetaminophen) for fever or pain. If you have liver or kidney problems, ask your family doctor if it's okay to take Tylenol.   Adults can take either:    650 mg (two 325 mg pills) every 4 to 6 hours, or...   1,000 mg (two 500 mg pills) every 8 hours as needed.    Note: Don't take more than 3,000 mg in one day. Acetaminophen is found in many medicines (both prescribed and over-the-counter medicines). Read all labels to be sure you don't take too much.   For children, check the Tylenol bottle for the right dose. The dose is based on the child's age or weight.    If you have other health problems (like cancer, heart failure, an organ transplant or severe kidney disease): Call your specialty clinic if you don't feel better in the next 2 days.       Know when to call 911. Emergency warning signs include:    Trouble breathing or shortness of breath Pain or pressure in the chest that doesn't go away Feeling confused like you haven't felt before, or not being able to wake up Bluish-colored lips or face  Where can I get more information?   [facility] -- About COVID-19: [email or web address]   CDC -- What to Do If You're Sick: www.cdc.gov/coronavirus/2019-ncov/about/steps-when-sick.html   CDC -- Ending Home Isolation: www.cdc.gov/coronavirus/2019-ncov/hcp/disposition-in-home-patients.html   CDC -- Caring for Someone: www.cdc.gov/coronavirus/2019-ncov/if-you-are-sick/care-for-someone.html   [facility] -- Interim Guidance for Hospital Discharge to Home: www.health.[facility].[place]/diseases/coronavirus/hcp/hospdischarge.pdf   [facility] clinical trials (COVID-19 research studies): clinicalaffairs.[email or web address]/umn-clinical-trials    Below are the COVID-19 hotlines at the [place] Department of Health ([facility]). Interpreters are available.    For health questions: Call 098-458-6421 or 1-767.721.2079 (7 a.m.  to 7 p.m.) For questions about schools and childcare: Call 096-424-2412 or 1-341.877.6593 (7 a.m. to 7 p.m.)       Diagnosis: Cough  Diagnosis ICD: R05

## 2020-11-10 ENCOUNTER — COMMUNICATION - HEALTHEAST (OUTPATIENT)
Dept: FAMILY MEDICINE | Facility: CLINIC | Age: 35
End: 2020-11-10

## 2020-11-11 ENCOUNTER — APPOINTMENT (OUTPATIENT)
Dept: GENERAL RADIOLOGY | Facility: CLINIC | Age: 35
End: 2020-11-11
Attending: FAMILY MEDICINE
Payer: COMMERCIAL

## 2020-11-11 ENCOUNTER — COMMUNICATION - HEALTHEAST (OUTPATIENT)
Dept: SCHEDULING | Facility: CLINIC | Age: 35
End: 2020-11-11

## 2020-11-11 ENCOUNTER — APPOINTMENT (OUTPATIENT)
Dept: CT IMAGING | Facility: CLINIC | Age: 35
End: 2020-11-11
Attending: FAMILY MEDICINE
Payer: COMMERCIAL

## 2020-11-11 ENCOUNTER — HOSPITAL ENCOUNTER (OUTPATIENT)
Facility: CLINIC | Age: 35
Setting detail: OBSERVATION
Discharge: HOME | End: 2020-11-12
Attending: FAMILY MEDICINE | Admitting: INTERNAL MEDICINE
Payer: COMMERCIAL

## 2020-11-11 ENCOUNTER — RECORDS - HEALTHEAST (OUTPATIENT)
Dept: ADMINISTRATIVE | Facility: OTHER | Age: 35
End: 2020-11-11

## 2020-11-11 DIAGNOSIS — U07.1 PNEUMONIA DUE TO 2019 NOVEL CORONAVIRUS: ICD-10-CM

## 2020-11-11 DIAGNOSIS — J12.82 PNEUMONIA DUE TO 2019 NOVEL CORONAVIRUS: ICD-10-CM

## 2020-11-11 LAB
ALBUMIN SERPL-MCNC: 3.2 G/DL (ref 3.4–5)
ALP SERPL-CCNC: 92 U/L (ref 40–150)
ALT SERPL W P-5'-P-CCNC: 88 U/L (ref 0–50)
ANION GAP SERPL CALCULATED.3IONS-SCNC: 8 MMOL/L (ref 3–14)
AST SERPL W P-5'-P-CCNC: 108 U/L (ref 0–45)
BASOPHILS # BLD AUTO: 0 10E9/L (ref 0–0.2)
BASOPHILS NFR BLD AUTO: 0.3 %
BILIRUB SERPL-MCNC: 0.3 MG/DL (ref 0.2–1.3)
BUN SERPL-MCNC: 7 MG/DL (ref 7–30)
CALCIUM SERPL-MCNC: 9.4 MG/DL (ref 8.5–10.1)
CHLORIDE SERPL-SCNC: 104 MMOL/L (ref 94–109)
CO2 SERPL-SCNC: 24 MMOL/L (ref 20–32)
CREAT SERPL-MCNC: 0.6 MG/DL (ref 0.52–1.04)
CRP SERPL-MCNC: 110 MG/L (ref 0–8)
D DIMER PPP FEU-MCNC: 1.1 UG/ML FEU (ref 0–0.5)
DIFFERENTIAL METHOD BLD: NORMAL
EOSINOPHIL # BLD AUTO: 0 10E9/L (ref 0–0.7)
EOSINOPHIL NFR BLD AUTO: 0.1 %
ERYTHROCYTE [DISTWIDTH] IN BLOOD BY AUTOMATED COUNT: 11.9 % (ref 10–15)
FERRITIN SERPL-MCNC: 856 NG/ML (ref 12–150)
GFR SERPL CREATININE-BSD FRML MDRD: >90 ML/MIN/{1.73_M2}
GLUCOSE SERPL-MCNC: 88 MG/DL (ref 70–99)
HCT VFR BLD AUTO: 42.8 % (ref 35–47)
HGB BLD-MCNC: 14.8 G/DL (ref 11.7–15.7)
IMM GRANULOCYTES # BLD: 0.1 10E9/L (ref 0–0.4)
IMM GRANULOCYTES NFR BLD: 0.9 %
LYMPHOCYTES # BLD AUTO: 1.7 10E9/L (ref 0.8–5.3)
LYMPHOCYTES NFR BLD AUTO: 24.5 %
MCH RBC QN AUTO: 28.9 PG (ref 26.5–33)
MCHC RBC AUTO-ENTMCNC: 34.6 G/DL (ref 31.5–36.5)
MCV RBC AUTO: 84 FL (ref 78–100)
MONOCYTES # BLD AUTO: 0.5 10E9/L (ref 0–1.3)
MONOCYTES NFR BLD AUTO: 6.9 %
NEUTROPHILS # BLD AUTO: 4.6 10E9/L (ref 1.6–8.3)
NEUTROPHILS NFR BLD AUTO: 67.3 %
NRBC # BLD AUTO: 0 10*3/UL
NRBC BLD AUTO-RTO: 0 /100
PLATELET # BLD AUTO: 231 10E9/L (ref 150–450)
POTASSIUM SERPL-SCNC: 4 MMOL/L (ref 3.4–5.3)
PROCALCITONIN SERPL-MCNC: 0.05 NG/ML
PROT SERPL-MCNC: 7.8 G/DL (ref 6.8–8.8)
RBC # BLD AUTO: 5.12 10E12/L (ref 3.8–5.2)
SODIUM SERPL-SCNC: 136 MMOL/L (ref 133–144)
TROPONIN I SERPL-MCNC: <0.015 UG/L (ref 0–0.04)
WBC # BLD AUTO: 6.8 10E9/L (ref 4–11)

## 2020-11-11 PROCEDURE — 99285 EMERGENCY DEPT VISIT HI MDM: CPT | Mod: 25 | Performed by: FAMILY MEDICINE

## 2020-11-11 PROCEDURE — 71275 CT ANGIOGRAPHY CHEST: CPT

## 2020-11-11 PROCEDURE — 80053 COMPREHEN METABOLIC PANEL: CPT | Performed by: FAMILY MEDICINE

## 2020-11-11 PROCEDURE — 96360 HYDRATION IV INFUSION INIT: CPT | Mod: 59 | Performed by: FAMILY MEDICINE

## 2020-11-11 PROCEDURE — 84484 ASSAY OF TROPONIN QUANT: CPT | Performed by: FAMILY MEDICINE

## 2020-11-11 PROCEDURE — 258N000003 HC RX IP 258 OP 636: Performed by: FAMILY MEDICINE

## 2020-11-11 PROCEDURE — 250N000013 HC RX MED GY IP 250 OP 250 PS 637: Performed by: PHYSICIAN ASSISTANT

## 2020-11-11 PROCEDURE — G0378 HOSPITAL OBSERVATION PER HR: HCPCS

## 2020-11-11 PROCEDURE — 36415 COLL VENOUS BLD VENIPUNCTURE: CPT | Performed by: FAMILY MEDICINE

## 2020-11-11 PROCEDURE — 86140 C-REACTIVE PROTEIN: CPT | Performed by: FAMILY MEDICINE

## 2020-11-11 PROCEDURE — 96361 HYDRATE IV INFUSION ADD-ON: CPT | Performed by: FAMILY MEDICINE

## 2020-11-11 PROCEDURE — 99220 PR INITIAL OBSERVATION CARE,LEVEL III: CPT | Performed by: PHYSICIAN ASSISTANT

## 2020-11-11 PROCEDURE — 250N000011 HC RX IP 250 OP 636: Performed by: FAMILY MEDICINE

## 2020-11-11 PROCEDURE — 85025 COMPLETE CBC W/AUTO DIFF WBC: CPT | Performed by: FAMILY MEDICINE

## 2020-11-11 PROCEDURE — 84145 PROCALCITONIN (PCT): CPT | Performed by: FAMILY MEDICINE

## 2020-11-11 PROCEDURE — 99285 EMERGENCY DEPT VISIT HI MDM: CPT | Performed by: FAMILY MEDICINE

## 2020-11-11 PROCEDURE — 82728 ASSAY OF FERRITIN: CPT | Performed by: FAMILY MEDICINE

## 2020-11-11 PROCEDURE — 250N000013 HC RX MED GY IP 250 OP 250 PS 637: Performed by: FAMILY MEDICINE

## 2020-11-11 PROCEDURE — 71045 X-RAY EXAM CHEST 1 VIEW: CPT

## 2020-11-11 PROCEDURE — 250N000009 HC RX 250: Performed by: FAMILY MEDICINE

## 2020-11-11 PROCEDURE — 85379 FIBRIN DEGRADATION QUANT: CPT | Performed by: FAMILY MEDICINE

## 2020-11-11 PROCEDURE — 250N000011 HC RX IP 250 OP 636: Performed by: PHYSICIAN ASSISTANT

## 2020-11-11 PROCEDURE — 96372 THER/PROPH/DIAG INJ SC/IM: CPT | Mod: 59 | Performed by: PHYSICIAN ASSISTANT

## 2020-11-11 RX ORDER — ACETAMINOPHEN 500 MG
1000 TABLET ORAL EVERY 6 HOURS PRN
COMMUNITY
End: 2022-05-16

## 2020-11-11 RX ORDER — ACETAMINOPHEN 325 MG/1
650 TABLET ORAL EVERY 4 HOURS PRN
Status: DISCONTINUED | OUTPATIENT
Start: 2020-11-11 | End: 2020-11-12 | Stop reason: HOSPADM

## 2020-11-11 RX ORDER — SODIUM CHLORIDE, SODIUM LACTATE, POTASSIUM CHLORIDE, CALCIUM CHLORIDE 600; 310; 30; 20 MG/100ML; MG/100ML; MG/100ML; MG/100ML
INJECTION, SOLUTION INTRAVENOUS CONTINUOUS
Status: DISCONTINUED | OUTPATIENT
Start: 2020-11-11 | End: 2020-11-11

## 2020-11-11 RX ORDER — AMOXICILLIN 250 MG
1 CAPSULE ORAL 2 TIMES DAILY
Status: DISCONTINUED | OUTPATIENT
Start: 2020-11-11 | End: 2020-11-12

## 2020-11-11 RX ORDER — ONDANSETRON 2 MG/ML
4 INJECTION INTRAMUSCULAR; INTRAVENOUS EVERY 6 HOURS PRN
Status: DISCONTINUED | OUTPATIENT
Start: 2020-11-11 | End: 2020-11-12 | Stop reason: HOSPADM

## 2020-11-11 RX ORDER — IOPAMIDOL 755 MG/ML
89 INJECTION, SOLUTION INTRAVASCULAR ONCE
Status: COMPLETED | OUTPATIENT
Start: 2020-11-11 | End: 2020-11-11

## 2020-11-11 RX ORDER — ONDANSETRON 4 MG/1
4 TABLET, ORALLY DISINTEGRATING ORAL EVERY 6 HOURS PRN
Status: DISCONTINUED | OUTPATIENT
Start: 2020-11-11 | End: 2020-11-12 | Stop reason: HOSPADM

## 2020-11-11 RX ORDER — ALBUTEROL SULFATE 90 UG/1
2 AEROSOL, METERED RESPIRATORY (INHALATION) EVERY 4 HOURS PRN
Status: DISCONTINUED | OUTPATIENT
Start: 2020-11-11 | End: 2020-11-12 | Stop reason: HOSPADM

## 2020-11-11 RX ORDER — AMOXICILLIN 250 MG
2 CAPSULE ORAL 2 TIMES DAILY
Status: DISCONTINUED | OUTPATIENT
Start: 2020-11-11 | End: 2020-11-12

## 2020-11-11 RX ORDER — POLYETHYLENE GLYCOL 3350 17 G/17G
17 POWDER, FOR SOLUTION ORAL DAILY
Status: DISCONTINUED | OUTPATIENT
Start: 2020-11-12 | End: 2020-11-12

## 2020-11-11 RX ORDER — ACETAMINOPHEN 500 MG
1000 TABLET ORAL ONCE
Status: COMPLETED | OUTPATIENT
Start: 2020-11-11 | End: 2020-11-11

## 2020-11-11 RX ORDER — ACETAMINOPHEN 650 MG/1
650 SUPPOSITORY RECTAL EVERY 4 HOURS PRN
Status: DISCONTINUED | OUTPATIENT
Start: 2020-11-11 | End: 2020-11-12 | Stop reason: HOSPADM

## 2020-11-11 RX ADMIN — SODIUM CHLORIDE 100 ML: 9 INJECTION, SOLUTION INTRAVENOUS at 17:06

## 2020-11-11 RX ADMIN — ACETAMINOPHEN 650 MG: 325 TABLET, FILM COATED ORAL at 23:20

## 2020-11-11 RX ADMIN — IOPAMIDOL 89 ML: 755 INJECTION, SOLUTION INTRAVENOUS at 17:06

## 2020-11-11 RX ADMIN — ENOXAPARIN SODIUM 40 MG: 40 INJECTION SUBCUTANEOUS at 23:21

## 2020-11-11 RX ADMIN — SODIUM CHLORIDE, POTASSIUM CHLORIDE, SODIUM LACTATE AND CALCIUM CHLORIDE 1000 ML: 600; 310; 30; 20 INJECTION, SOLUTION INTRAVENOUS at 14:39

## 2020-11-11 RX ADMIN — ACETAMINOPHEN 1000 MG: 500 TABLET, FILM COATED ORAL at 18:22

## 2020-11-11 ASSESSMENT — MIFFLIN-ST. JEOR
SCORE: 1674.13
SCORE: 1672.04

## 2020-11-11 NOTE — ED PROVIDER NOTES
"  History     Chief Complaint   Patient presents with     Covid Concern     covid + and c/o soa and cough     HPI  Mei Kim is a 35 year old female who presents with cough fever and shortness of breath.  She became ill 8 days ago with muscle aches and a cough.  She had fevers as well.  In the past 2 days she feels short of breath and feels like she cannot catch her breath especially when she tries to take a deep breath.  Fevers have persisted.  She has muscle aches and headache.  She feels tight in the chest but does not have specific chest pain.  She has been having loose stools 2-3 times per day.  She has no abdominal pain.  She has no irritative or obstructive voiding symptoms.  When the symptoms began 8 days ago she went and got a Covid test at [facility] but did not get the results until yesterday when they told her it was positive.  She does not smoke.  She has no history of lung disease including no history of asthma.  She uses oral contraceptive but no other medications.    Allergies:  Allergies   Allergen Reactions     Sulfa Drugs        Problem List:    Patient Active Problem List    Diagnosis Date Noted     Pneumonia due to 2019 novel coronavirus 11/11/2020     Priority: Medium        Past Medical History:    No past medical history on file.    Past Surgical History:    No past surgical history on file.    Family History:    No family history on file.    Social History:  Marital Status:  Single [1]  Social History     Tobacco Use     Smoking status: Never Smoker     Smokeless tobacco: Never Used   Substance Use Topics     Alcohol use: Yes     Drug use: No        Medications:         drospirenone-ethinyl estradiol (GLENIS) 3-0.02 MG per tablet       ibuprofen (ADVIL,MOTRIN) 600 MG tablet          Review of Systems    All other systems are reviewed and are negative    Physical Exam   BP: (!) 148/81  Pulse: 121  Temp: 98.2  F (36.8  C)  Height: 160 cm (5' 3\")  Weight: 99.8 kg (220 lb)  SpO2: 94 " %      Physical Exam  Nursing note and vitals were reviewed.  Constitutional: Awake and alert, adequately nourished and developed appearing 35-year-old in no apparent discomfort, who appears moderately ill, and who answers questions appropriately and cooperates with examination.  HEENT: EOMI. PERRL  Neck: Freely mobile.  Cardiovascular: Cardiac examination reveals tachycardic heart rate and regular rhythm without murmur.  Pulmonary/Chest: Breathing is unlabored.  Breath sounds are clear and equal bilaterally.  There no retractions, tachypnea, rales, wheezes, or rhonchi.  Sats 94 to 96% on room air.  Abdomen: Soft, nontender, no HSM or masses rebound or guarding.  Musculoskeletal: Extremities are warm and well-perfused and without edema  Neurological: Alert, oriented, thought content logical, coherent   Skin: Warm, dry, no rashes.  Psychiatric: Affect current with acute illness.      ED Course        Procedures               Critical Care time:  none               Results for orders placed or performed during the hospital encounter of 11/11/20 (from the past 24 hour(s))   CBC with platelets differential   Result Value Ref Range    WBC 6.8 4.0 - 11.0 10e9/L    RBC Count 5.12 3.8 - 5.2 10e12/L    Hemoglobin 14.8 11.7 - 15.7 g/dL    Hematocrit 42.8 35.0 - 47.0 %    MCV 84 78 - 100 fl    MCH 28.9 26.5 - 33.0 pg    MCHC 34.6 31.5 - 36.5 g/dL    RDW 11.9 10.0 - 15.0 %    Platelet Count 231 150 - 450 10e9/L    Diff Method Automated Method     % Neutrophils 67.3 %    % Lymphocytes 24.5 %    % Monocytes 6.9 %    % Eosinophils 0.1 %    % Basophils 0.3 %    % Immature Granulocytes 0.9 %    Nucleated RBCs 0 0 /100    Absolute Neutrophil 4.6 1.6 - 8.3 10e9/L    Absolute Lymphocytes 1.7 0.8 - 5.3 10e9/L    Absolute Monocytes 0.5 0.0 - 1.3 10e9/L    Absolute Eosinophils 0.0 0.0 - 0.7 10e9/L    Absolute Basophils 0.0 0.0 - 0.2 10e9/L    Abs Immature Granulocytes 0.1 0 - 0.4 10e9/L    Absolute Nucleated RBC 0.0    Comprehensive  metabolic panel   Result Value Ref Range    Sodium 136 133 - 144 mmol/L    Potassium 4.0 3.4 - 5.3 mmol/L    Chloride 104 94 - 109 mmol/L    Carbon Dioxide 24 20 - 32 mmol/L    Anion Gap 8 3 - 14 mmol/L    Glucose 88 70 - 99 mg/dL    Urea Nitrogen 7 7 - 30 mg/dL    Creatinine 0.60 0.52 - 1.04 mg/dL    GFR Estimate >90 >60 mL/min/[1.73_m2]    GFR Estimate If Black >90 >60 mL/min/[1.73_m2]    Calcium 9.4 8.5 - 10.1 mg/dL    Bilirubin Total 0.3 0.2 - 1.3 mg/dL    Albumin 3.2 (L) 3.4 - 5.0 g/dL    Protein Total 7.8 6.8 - 8.8 g/dL    Alkaline Phosphatase 92 40 - 150 U/L    ALT 88 (H) 0 - 50 U/L     (H) 0 - 45 U/L   D dimer quantitative   Result Value Ref Range    D Dimer 1.1 (H) 0.0 - 0.50 ug/ml FEU   XR Chest Port 1 View    Narrative    XR CHEST PORT 1 VW 11/11/2020 3:40 PM    HISTORY: Cough and fever    COMPARISON: None.    FINDINGS: The patient is taking a shallow inspiration. There are  bilateral basilar infiltrates with associated small bilateral pleural  effusions. Upper lungs remain clear. Normal heart size.      Impression    IMPRESSION: Bilateral basilar infiltrates with small associated  effusions.    SOLO SEGAL MD   CT Chest Pulmonary Embolism w Contrast    Narrative    EXAM: CT CHEST PULMONARY EMBOLISM W CONTRAST  LOCATION: [facility]  DATE/TIME: 11/11/2020 5:05 PM    INDICATION: Dyspnea, elevated D-dimer.  COMPARISON: None.  TECHNIQUE: CT chest pulmonary angiogram during arterial phase injection of IV contrast. Multiplanar reformats and MIP reconstructions were performed. Dose reduction techniques were used.   CONTRAST: 89 mL Isovue-370    FINDINGS:  ANGIOGRAM CHEST: Pulmonary arteries are normal caliber and negative for pulmonary emboli. Thoracic aorta is negative for dissection. No CT evidence of right heart strain.    LUNGS AND PLEURA: Patchy areas of consolidation and groundglass opacity throughout both hemithoraces with a peripheral and basilar predominance. No  effusion.    MEDIASTINUM/AXILLAE: Mild mediastinal and bilateral hilar adenopathy.    UPPER ABDOMEN: Hepatic steatosis.    MUSCULOSKELETAL: Degenerative disease.      Impression    IMPRESSION:  1.  Extensive areas of groundglass opacity and consolidation likely infectious or inflammatory with findings consistent with COVID pneumonia in the appropriate clinical setting. Follow-up recommended to ensure resolution of the lung findings and   adenopathy to exclude other underlying pathology.  2.  No pulmonary embolus, aortic aneurysm or dissection.  3.  Hepatic steatosis.    Commonly reported imaging features of (COVID-19) pneumonia are present. Influenza pneumonia and organizing pneumonia as can be seen in the setting of drug toxicity and connective tissue disease can cause a similar imaging pattern.       Medications   lactated ringers BOLUS 1,000 mL (0 mLs Intravenous Stopped 11/11/20 1822)     Followed by   lactated ringers BOLUS 1,000 mL (has no administration in time range)     Followed by   lactated ringers infusion (has no administration in time range)   iopamidol (ISOVUE-370) solution 89 mL (89 mLs Intravenous Given 11/11/20 1706)   sodium chloride 0.9 % bag 500mL for CT scan flush use (100 mLs As instructed Given 11/11/20 1706)   acetaminophen (TYLENOL) tablet 1,000 mg (1,000 mg Oral Given 11/11/20 1822)       Assessments & Plan (with Medical Decision Making)     35-year-old female previously healthy on oral contraceptives has had 8 days of fevers, shortness of breath, chest pain, myalgias.  She had a positive Covid test obtained a week ago reported yesterday.  Work-up in the emergency department identified a chest x-ray that was difficult to interpret on a single AP view with her weight.  Her D-dimer came back at 1.1.  In the setting of her oral contraceptive use and Covid infection, she proceeded to chest CT using PE protocol to look for evidence of pulmonary embolism.  None was found.  However she had  extensive pneumonia typical of COVID-19 disease.  O2 sats were around 92% on room air.  She was moderately tachycardic in the 120s.  For these reasons I recommended hospitalization for monitoring and observation to ensure she does not further decompensate and require airway support.  She was agreeable to this.  Nikolas her case with Betty Trivedi of the hospital service and they will assume care and admission.  I will defer to the inpatient team whether they wish to begin empiric antibiotic therapy but at this time I do not have suspicion for a secondary bacterial pneumonia.  CBC and blood chemistries are all normal with the exception of mild transaminitis.  This could relate to her hepatic steatosis on CT.     I have reviewed the nursing notes.    I have reviewed the findings, diagnosis, plan and need for follow up with the patient.       New Prescriptions    No medications on file       Final diagnoses:   Pneumonia due to 2019 novel coronavirus       11/11/2020   [facility] EMERGENCY DEPT     Kris Willis MD  11/11/20 [id number]

## 2020-11-12 ENCOUNTER — RECORDS - HEALTHEAST (OUTPATIENT)
Dept: ADMINISTRATIVE | Facility: OTHER | Age: 35
End: 2020-11-12

## 2020-11-12 VITALS
BODY MASS INDEX: 39.45 KG/M2 | TEMPERATURE: 102.6 F | RESPIRATION RATE: 18 BRPM | SYSTOLIC BLOOD PRESSURE: 100 MMHG | HEIGHT: 63 IN | DIASTOLIC BLOOD PRESSURE: 46 MMHG | OXYGEN SATURATION: 92 % | WEIGHT: 222.66 LBS | HEART RATE: 105 BPM

## 2020-11-12 LAB
ANION GAP SERPL CALCULATED.3IONS-SCNC: 6 MMOL/L (ref 3–14)
AT III ACT/NOR PPP CHRO: 114 % (ref 85–135)
BASOPHILS # BLD AUTO: 0 10E9/L (ref 0–0.2)
BASOPHILS NFR BLD AUTO: 0.2 %
BUN SERPL-MCNC: 6 MG/DL (ref 7–30)
CALCIUM SERPL-MCNC: 9 MG/DL (ref 8.5–10.1)
CHLORIDE SERPL-SCNC: 106 MMOL/L (ref 94–109)
CK SERPL-CCNC: 30 U/L (ref 30–225)
CO2 SERPL-SCNC: 26 MMOL/L (ref 20–32)
CREAT SERPL-MCNC: 0.58 MG/DL (ref 0.52–1.04)
CRP SERPL-MCNC: 118 MG/L (ref 0–8)
D DIMER PPP FEU-MCNC: 0.7 UG/ML FEU (ref 0–0.5)
DIFFERENTIAL METHOD BLD: NORMAL
EOSINOPHIL # BLD AUTO: 0 10E9/L (ref 0–0.7)
EOSINOPHIL NFR BLD AUTO: 0 %
ERYTHROCYTE [DISTWIDTH] IN BLOOD BY AUTOMATED COUNT: 12 % (ref 10–15)
FERRITIN SERPL-MCNC: 888 NG/ML (ref 12–150)
FIBRINOGEN PPP-MCNC: 625 MG/DL (ref 200–420)
GFR SERPL CREATININE-BSD FRML MDRD: >90 ML/MIN/{1.73_M2}
GLUCOSE SERPL-MCNC: 90 MG/DL (ref 70–99)
HCT VFR BLD AUTO: 38.4 % (ref 35–47)
HGB BLD-MCNC: 12.9 G/DL (ref 11.7–15.7)
IL6 SERPL-MCNC: 70.96 PG/ML
IMM GRANULOCYTES # BLD: 0 10E9/L (ref 0–0.4)
IMM GRANULOCYTES NFR BLD: 0.7 %
INR PPP: 1.02 (ref 0.86–1.14)
LDH SERPL L TO P-CCNC: 268 U/L (ref 81–234)
LYMPHOCYTES # BLD AUTO: 1.3 10E9/L (ref 0.8–5.3)
LYMPHOCYTES NFR BLD AUTO: 22 %
MCH RBC QN AUTO: 28.6 PG (ref 26.5–33)
MCHC RBC AUTO-ENTMCNC: 33.6 G/DL (ref 31.5–36.5)
MCV RBC AUTO: 85 FL (ref 78–100)
MONOCYTES # BLD AUTO: 0.4 10E9/L (ref 0–1.3)
MONOCYTES NFR BLD AUTO: 6.2 %
NEUTROPHILS # BLD AUTO: 4.1 10E9/L (ref 1.6–8.3)
NEUTROPHILS NFR BLD AUTO: 70.9 %
NRBC # BLD AUTO: 0 10*3/UL
NRBC BLD AUTO-RTO: 0 /100
PLATELET # BLD AUTO: 243 10E9/L (ref 150–450)
POTASSIUM SERPL-SCNC: 3.7 MMOL/L (ref 3.4–5.3)
RBC # BLD AUTO: 4.51 10E12/L (ref 3.8–5.2)
RETICS # AUTO: 30.2 10E9/L (ref 25–95)
RETICS/RBC NFR AUTO: 0.7 % (ref 0.5–2)
SODIUM SERPL-SCNC: 138 MMOL/L (ref 133–144)
TROPONIN I SERPL-MCNC: <0.015 UG/L (ref 0–0.04)
WBC # BLD AUTO: 5.8 10E9/L (ref 4–11)

## 2020-11-12 PROCEDURE — 83615 LACTATE (LD) (LDH) ENZYME: CPT | Performed by: INTERNAL MEDICINE

## 2020-11-12 PROCEDURE — 36415 COLL VENOUS BLD VENIPUNCTURE: CPT | Performed by: INTERNAL MEDICINE

## 2020-11-12 PROCEDURE — 85025 COMPLETE CBC W/AUTO DIFF WBC: CPT | Performed by: INTERNAL MEDICINE

## 2020-11-12 PROCEDURE — 85045 AUTOMATED RETICULOCYTE COUNT: CPT | Performed by: INTERNAL MEDICINE

## 2020-11-12 PROCEDURE — 83520 IMMUNOASSAY QUANT NOS NONAB: CPT | Performed by: INTERNAL MEDICINE

## 2020-11-12 PROCEDURE — 80048 BASIC METABOLIC PNL TOTAL CA: CPT | Performed by: INTERNAL MEDICINE

## 2020-11-12 PROCEDURE — 82550 ASSAY OF CK (CPK): CPT | Performed by: INTERNAL MEDICINE

## 2020-11-12 PROCEDURE — 84484 ASSAY OF TROPONIN QUANT: CPT | Performed by: INTERNAL MEDICINE

## 2020-11-12 PROCEDURE — 85610 PROTHROMBIN TIME: CPT | Performed by: INTERNAL MEDICINE

## 2020-11-12 PROCEDURE — 86140 C-REACTIVE PROTEIN: CPT | Performed by: INTERNAL MEDICINE

## 2020-11-12 PROCEDURE — G0378 HOSPITAL OBSERVATION PER HR: HCPCS

## 2020-11-12 PROCEDURE — 250N000013 HC RX MED GY IP 250 OP 250 PS 637: Performed by: PHYSICIAN ASSISTANT

## 2020-11-12 PROCEDURE — 99217 PR OBSERVATION CARE DISCHARGE: CPT | Performed by: INTERNAL MEDICINE

## 2020-11-12 PROCEDURE — 82728 ASSAY OF FERRITIN: CPT | Performed by: INTERNAL MEDICINE

## 2020-11-12 PROCEDURE — 85379 FIBRIN DEGRADATION QUANT: CPT | Performed by: INTERNAL MEDICINE

## 2020-11-12 PROCEDURE — 85300 ANTITHROMBIN III ACTIVITY: CPT | Performed by: INTERNAL MEDICINE

## 2020-11-12 PROCEDURE — 85384 FIBRINOGEN ACTIVITY: CPT | Performed by: INTERNAL MEDICINE

## 2020-11-12 RX ORDER — DEXAMETHASONE 6 MG/1
6 TABLET ORAL DAILY
Qty: 10 TABLET | Refills: 0 | Status: SHIPPED | OUTPATIENT
Start: 2020-11-12 | End: 2022-05-15

## 2020-11-12 RX ADMIN — ACETAMINOPHEN 650 MG: 325 TABLET, FILM COATED ORAL at 04:30

## 2020-11-12 RX ADMIN — ACETAMINOPHEN 650 MG: 325 TABLET, FILM COATED ORAL at 13:28

## 2020-11-12 ASSESSMENT — MIFFLIN-ST. JEOR: SCORE: 1684.13

## 2020-11-12 NOTE — PROGRESS NOTES
"[place] [facility] [place]    Patient admitted to room 2317 at approximately 2235 via cart from emergency room. Patient was accompanied by transport tech.     Verbal SBAR report received from N[place]it prior to patient arrival.     Patient ambulated to bed independently. Patient alert and oriented X 3. Pain is controlled with current analgesics.  Medication(s) being used: acetaminophen. 0-10 Pain Scale: 4. Admission vital signs: Blood pressure 112/64, pulse 105, temperature 100.2  F (37.9  C), temperature source Oral, resp. rate 18, height 1.6 m (5' 3\"), weight 101 kg (222 lb 10.6 oz), SpO2 92 %. Patient was oriented to plan of care, call light.     Risk Assessment    The following safety risks were identified during admission: isolation. Yellow risk band applied: NO.     Skin Initial Assessment    Patient declined secondary skin assessment.    Desmond Risk Assessment  Sensory Perception: 4-->no impairment  Moisture: 4-->rarely moist  Activity: 4-->walks frequently  Mobility: 4-->no limitation  Nutrition: 3-->adequate  Friction and Shear: 3-->no apparent problem  Desmond Score: 22  Bed Support Surface: Atmos Air mattress  Reassessed using Bed Algorithm: No    Education    Patient has a [place] to Observation order: Yes  Observation education completed and documented: Yes      Zina Horton RN    "

## 2020-11-12 NOTE — ED NOTES
Patient has  [place] to Observation  order. Patient has been given Patient Bill of Rights, Observation brochure and  What does Observation mean to me  forms.  Patient has been given the opportunity to ask questions about observation status and their plan of care.  Patient has been oriented to the observation room, bathroom, and call light is in place.    Princess Ruano RN

## 2020-11-12 NOTE — PLAN OF CARE
Problem: Gas Exchange Impaired  Goal: Optimal Gas Exchange  Outcome: No Change     Continuous pulse oximetry in place.  O2 saturation 92% on room air.  Given 650 mg PRN Tylenol for fever of 100.1 F and headache rated 4/10 which patient reported was helpful.  Independent in room.

## 2020-11-12 NOTE — PROGRESS NOTES
"O2 saturation continues to be > 90% on room air.  Lung sounds diminished.  Frequent, nonproductive cough. Given another 650 mg Tylenol for fever of 100.2 F and headache rated 6/10.  Fluids promoted.  Endorses baseline urinary output and reports urine was \"yellow\" in ED.  Senna held for loose stools.    "

## 2020-11-12 NOTE — DISCHARGE SUMMARY
[facility]   Hospitalist Discharge Summary       Date of Admission:  11/11/2020  Date of Discharge:  11/12/2020  Discharging Provider: Baudilio Trejo MD      Discharge Diagnoses     Pneumonia due to 2019 novel coronavirus    Follow-ups Needed After Discharge   Follow-up Appointments     Follow-up and recommended labs and tests       Follow up with primary care provider, Sade Krueger, within 7 days for   hospital follow- up.  The following labs/tests are recommended: BMP and   CBC.           Unresulted Labs Ordered in the Past 30 Days of this Admission     Date and Time Order Name Status Description    11/12/2020 0011 Interleukin 6 Blood In process     11/12/2020 0011 Antithrombin III In process       These results will be followed up by Baudilio Trejo MD    Discharge Disposition   Discharged to home  Condition at discharge: Stable    Hospital Course   Mei Kim is a 35 year old female who presents on 11/11/2020 with a known COVID infection and now worsening symptoms.     Pneumonia due to 2019 novel coronavirus  Patient reports being tested for Covid at outside facility on November 4, 2020 and found that she was positive. symptoms started 11/3/2020. Cough, fever, fatigue, muscle aches, diarrhea. Covid labs: , ferritin 856, ALT 88, , D-dimer 1.1.  CT chest with no evidence for PE, did show extensive areas of groundglass opacity and consolidation.   - The patient was monitored overnight on continuous pulse oximetry and remained stable off oxygen.   - Patient discharged to home to complete 10 day course of dexamethasone    Consultations This Hospital Stay   None    Code Status   Full Code    Time Spent on this Encounter   I, Baudilio Trejo MD, personally saw the patient today and spent greater than 30 minutes discharging this patient.       Baudilio Trejo MD  [facility]   ______________________________________________________________________    Physical Exam   Vital Signs: Temp: 99.7  F (37.6  C) Temp src: Oral BP: 100/46 Pulse: 105   Resp: 18 SpO2: 92 % O2 Device: None (Room air)    Weight: 222 lbs 10.63 oz       Gen: Well nourished, well developed, alert and oriented x 3, no acute distressed  HEENT: Atraumatic, normocephalic; sclera non-injected, anicterric; oral mucosa moist, no lesion, no exudate  Lungs: Bibasilar rales, no wheezes, no rhonchi  Heart: Regular rate, regular rhythm, no gallops, no rubs, no murmurs  GI: Bowel sound normal, no hepatosplenomegaly, no masses, non-tender, non-distended, no guarding, no rebound tenderness  Lymph: No lymphadenopathy, no edema  Skin: No rashes, no chronic venous stasis     Primary Care Physician   Sade Krueger    Discharge Orders      COVID-19 GetWell Loop Referral      Reason for your hospital stay    This is a 35 year old female admitted with pneumonia due to COVID-19.     Follow-up and recommended labs and tests     Follow up with primary care provider, Sade Krueger, within 7 days for hospital follow- up.  The following labs/tests are recommended: BMP and CBC.     Discharge - Quarantine/Isolation Instruction    Date of symptom onset:     Date of first positive test:    If you tested positive COVID-19 and show symptoms (fever, cough, body aches or trouble breathing):        Stay home and away from others (self-isolate) until:        At least 10 days have passed since your symptoms started. AND...        You've had no fever-and no medicine that reduces fever-for 3 full days (72 hours). AND...         Your other symptoms have resolved (gotten better).  If you tested positive for COVID-19 but don't show symptoms:       Stay home and away from others (self-isolate) until at least 10 days have passed since the date of your first positive COVID-19 test.     Contact provider    Contact your primary care provider if If increased  trouble breathing, new arm/leg swelling, dizziness/passing out, falls, bleeding that doesn't stop, or uncontrolled pain.     Activity    Your activity upon discharge: activity as tolerated     Diet    Follow this diet upon discharge: Orders Placed This Encounter      Regular Diet Adult       Significant Results and Procedures   Most Recent 3 CBC's:  Recent Labs   Lab Test 11/12/20 0539 11/11/20  1440 03/28/15  2349   WBC 5.8 6.8 32.4*   HGB 12.9 14.8 13.9   MCV 85 84 85    231 393     Most Recent 3 BMP's:  Recent Labs   Lab Test 11/12/20 0539 11/11/20  1440 03/28/15  2349    136 139   POTASSIUM 3.7 4.0 3.6   CHLORIDE 106 104 107   CO2 26 24 20   BUN 6* 7 14   CR 0.58 0.60 0.53   ANIONGAP 6 8 12   REBECA 9.0 9.4 8.7   GLC 90 88 128*     Most Recent 2 LFT's:  Recent Labs   Lab Test 11/11/20  1440 03/28/15  2349   * 21   ALT 88* 26   ALKPHOS 92 94   BILITOTAL 0.3 0.4     Most Recent 3 INR's:  Recent Labs   Lab Test 11/12/20  0539   INR 1.02     Most Recent 3 Troponin's:  Recent Labs   Lab Test 11/12/20 0539 11/11/20  1500   TROPI <0.015 <0.015     Most Recent D-dimer:  Recent Labs   Lab Test 11/12/20  0539   DD 0.7*     Most Recent 6 Bacteria Isolates From Any Culture (See EPIC Reports for Culture Details):No lab results found.  Most Recent ABG:No lab results found.  Most Recent ESR & CRP:  Recent Labs   Lab Test 11/12/20  0539   .0*     Most Recent CPK:  Recent Labs   Lab Test 11/12/20  0539   CKT 30   ,   Results for orders placed or performed during the hospital encounter of 11/11/20   XR Chest Port 1 View    Narrative    XR CHEST PORT 1 VW 11/11/2020 3:40 PM    HISTORY: Cough and fever    COMPARISON: None.    FINDINGS: The patient is taking a shallow inspiration. There are  bilateral basilar infiltrates with associated small bilateral pleural  effusions. Upper lungs remain clear. Normal heart size.      Impression    IMPRESSION: Bilateral basilar infiltrates with small  associated  effusions.    SOLO SEGAL MD   CT Chest Pulmonary Embolism w Contrast    Narrative    EXAM: CT CHEST PULMONARY EMBOLISM W CONTRAST  LOCATION: [facility]  DATE/TIME: 11/11/2020 5:05 PM    INDICATION: Dyspnea, elevated D-dimer.  COMPARISON: None.  TECHNIQUE: CT chest pulmonary angiogram during arterial phase injection of IV contrast. Multiplanar reformats and MIP reconstructions were performed. Dose reduction techniques were used.   CONTRAST: 89 mL Isovue-370    FINDINGS:  ANGIOGRAM CHEST: Pulmonary arteries are normal caliber and negative for pulmonary emboli. Thoracic aorta is negative for dissection. No CT evidence of right heart strain.    LUNGS AND PLEURA: Patchy areas of consolidation and groundglass opacity throughout both hemithoraces with a peripheral and basilar predominance. No effusion.    MEDIASTINUM/AXILLAE: Mild mediastinal and bilateral hilar adenopathy.    UPPER ABDOMEN: Hepatic steatosis.    MUSCULOSKELETAL: Degenerative disease.      Impression    IMPRESSION:  1.  Extensive areas of groundglass opacity and consolidation likely infectious or inflammatory with findings consistent with COVID pneumonia in the appropriate clinical setting. Follow-up recommended to ensure resolution of the lung findings and   adenopathy to exclude other underlying pathology.  2.  No pulmonary embolus, aortic aneurysm or dissection.  3.  Hepatic steatosis.    Commonly reported imaging features of (COVID-19) pneumonia are present. Influenza pneumonia and organizing pneumonia as can be seen in the setting of drug toxicity and connective tissue disease can cause a similar imaging pattern.       Discharge Medications   Current Discharge Medication List      START taking these medications    Details   dexamethasone (DECADRON) 6 MG tablet Take 1 tablet (6 mg) by mouth daily  Qty: 10 tablet, Refills: 0    Associated Diagnoses: Pneumonia due to 2019 novel coronavirus         CONTINUE these medications  which have NOT CHANGED    Details   acetaminophen (TYLENOL) 500 MG tablet Take 1,000 mg by mouth every 6 hours as needed for mild pain      drospirenone-ethinyl estradiol (GLENIS) 3-0.02 MG per tablet Take 1 tablet by mouth      ibuprofen (ADVIL,MOTRIN) 600 MG tablet Take 1 tablet (600 mg) by mouth every 6 hours as needed for moderate pain  Qty: 30 tablet, Refills: 1           Allergies   Allergies   Allergen Reactions     Sulfa Drugs    ;l

## 2020-11-12 NOTE — ED NOTES
Pt given food tray and water. She appears flushed and in tachycardic. Temperature taken, 100.6F. tylenol ordered. She denies any needs or complaints

## 2020-11-12 NOTE — H&P
[facility]     History and Physical  Hospital Medicine       Date of Admission:  11/11/2020  Date of Service: 11/11/2020     Assessment & Plan   Mei Kim is a 35 year old female who presents on 11/11/2020 with a known COVID infection and now worsening symptoms.     Pneumonia due to 2019 novel coronavirus  Patient reports being tested for Covid at outside facility on November 4, 2020 and found that she was positive. symptoms started 11/3/2020. Cough, fever, fatigue, muscle aches, diarrhea. Covid labs: , ferritin 856, ALT 88, , D-dimer 1.1.  CT chest with no evidence for PE, did show extensive areas of groundglass opacity and consolidation.   - Daily Covid labs.   - Supplemental oxygen to maintain saturations above 92%.   - Continuous pulse ox monitoring.   - Special precautions in place.   - Did not initiate Decadron or remdesivir admission, if becomes hypoxic would consider.   - Lovenox for DVT prophylaxis per protocol.         Diet: Regular Diet Adult    DVT Prophylaxis: Enoxaparin (Lovenox) SQ  Davis Catheter: not present  Code Status:  Full     Disposition Plan   Expected discharge: 2 - 3 days, recommended to prior living arrangement once adequate pain management/ tolerating PO medications, hemoglobin stable and safe disposition plan.  Entered: Betty Trivedi PA-C 11/11/2020, 8:40 PM       Status: Patient is appropriate for admission to observation for further evaluation and treatment.    Betty Trivedi PA-C        The patient's care was discussed with the Attending Physician, Dr. Emma Bhakta, ED provider, Dr. Willis, and the patient.     Primary Care Physician   Sade Krueger [phone]    History is obtained from the patient, Mei Kim and review of old records via the EMR.    History of Present Illness   Mei Kim is a 35 year old female with past medical history of migraine headaches who now presents on 11/11/2020 with a known  COVID infection and now worsening symptoms.     Onset of symptoms on November 3, 2020.  She was tested on November 4 and found that she was positive for COVID-19 infection.  She reports her symptoms include cough, fever, shortness of breath, muscle aches, headache, loose stools.  She also has had loss of taste and smell.  She denies sore throat.  Denies abdominal pain, nausea, vomiting.  No chest pain.    Patient lives at home with her fiance who is currently not ill.     She denies tobacco use. No known pulmonary disease.    Review of Systems   CONSTITUTIONAL:  positive for  fevers, chills, fatigue, malaise  EYES:  negative  HEENT:  positive for  nasal congestion  RESPIRATORY:  positive for  dry cough and dyspnea  CARDIOVASCULAR:  positive for tachycardia   GASTROINTESTINAL:  positive for diarrhea  GENITOURINARY:  negative  HEMATOLOGIC/LYMPHATIC:  negative  ENDOCRINE:  negative  MUSCULOSKELETAL:  positive for  myalgias  NEUROLOGICAL:  positive for headaches  BEHAVIOR/PSYCH:  negative    Past Medical History    Past Medical History:   Diagnosis Date     Migraine headache      Patient Active Problem List    Diagnosis Date Noted     Pneumonia due to 2019 novel coronavirus 11/11/2020     Priority: Medium        Past Surgical History   Past Surgical History:   Procedure Laterality Date     CHOLECYSTECTOMY          Prior to Admission Medications   Prior to Admission Medications   Prescriptions Last Dose Informant Patient Reported? Taking?   drospirenone-ethinyl estradiol (GLENIS) 3-0.02 MG per tablet   Yes No   Sig: Take 1 tablet by mouth   ibuprofen (ADVIL,MOTRIN) 600 MG tablet   No No   Sig: Take 1 tablet (600 mg) by mouth every 6 hours as needed for moderate pain   Patient not taking: Reported on 9/6/2018      Facility-Administered Medications: None     Allergies   Allergies   Allergen Reactions     Sulfa Drugs        Family History    Family History   Problem Relation Age of Onset     Hypertension Mother      Heart  "Disease Father      Bipolar Disorder Sister        Social History       Physical Exam   /74   Pulse 113   Temp 100.6  F (38.1  C)   Ht 1.6 m (5' 3\")   Wt 99.8 kg (220 lb)   SpO2 92%   BMI 38.97 kg/m       Weight: 220 lbs 0 oz Body mass index is 38.97 kg/m .     Constitutional: Alert, oriented, cooperative, no apparent distress, appears nontoxic, speaking in full sentences, appears stated age.  Eyes: Eyes are clear, pupils are reactive. No scleral icterus. Extroccular movements intact.   HEENT: Did not assess oropharynx in setting of known COVID infection. Normocephalic, no evidence of cranial trauma.   Cardiovascular: Regular rhythm, tachycardia, normal S1 and S2. No murmur appreciated. Peripheral pulses intact bilaterally. No lower extremity edema.  Respiratory: Lung sounds are clear to auscultation bilaterally without wheezes, rhonchi, or crackles.  GI: Obese Soft, non-distended. Non-tender, no rebound or guarding. No hepatosplenomegaly or masses appreciated. Normal bowel sounds.  Musculoskeletal: Without obvious deformity, moves all extremities approprietly. Normal muscle bulk and tone.   Skin: Warm and dry, no rashes or ecchymoses.   Neurologic: Neck supple. Patient moves all extremities. Cranial nerves are grossly intact. Gross strength and sensation are equal in bilateral upper and lower extremities.   Genitourinary: Deferred      Data   Data reviewed today:   Recent Labs   Lab 11/11/20  1500 11/11/20  1440   WBC  --  6.8   HGB  --  14.8   MCV  --  84   PLT  --  231   NA  --  136   POTASSIUM  --  4.0   CHLORIDE  --  104   CO2  --  24   BUN  --  7   CR  --  0.60   ANIONGAP  --  8   REBECA  --  9.4   GLC  --  88   ALBUMIN  --  3.2*   PROTTOTAL  --  7.8   BILITOTAL  --  0.3   ALKPHOS  --  92   ALT  --  88*   AST  --  108*   TROPI <0.015  --        Recent Results (from the past 24 hour(s))   XR Chest Port 1 View    Narrative    XR CHEST PORT 1 VW 11/11/2020 3:40 PM    HISTORY: Cough and " fever    COMPARISON: None.    FINDINGS: The patient is taking a shallow inspiration. There are  bilateral basilar infiltrates with associated small bilateral pleural  effusions. Upper lungs remain clear. Normal heart size.      Impression    IMPRESSION: Bilateral basilar infiltrates with small associated  effusions.    SOLO SEGAL MD   CT Chest Pulmonary Embolism w Contrast    Narrative    EXAM: CT CHEST PULMONARY EMBOLISM W CONTRAST  LOCATION: [facility]  DATE/TIME: 11/11/2020 5:05 PM    INDICATION: Dyspnea, elevated D-dimer.  COMPARISON: None.  TECHNIQUE: CT chest pulmonary angiogram during arterial phase injection of IV contrast. Multiplanar reformats and MIP reconstructions were performed. Dose reduction techniques were used.   CONTRAST: 89 mL Isovue-370    FINDINGS:  ANGIOGRAM CHEST: Pulmonary arteries are normal caliber and negative for pulmonary emboli. Thoracic aorta is negative for dissection. No CT evidence of right heart strain.    LUNGS AND PLEURA: Patchy areas of consolidation and groundglass opacity throughout both hemithoraces with a peripheral and basilar predominance. No effusion.    MEDIASTINUM/AXILLAE: Mild mediastinal and bilateral hilar adenopathy.    UPPER ABDOMEN: Hepatic steatosis.    MUSCULOSKELETAL: Degenerative disease.      Impression    IMPRESSION:  1.  Extensive areas of groundglass opacity and consolidation likely infectious or inflammatory with findings consistent with COVID pneumonia in the appropriate clinical setting. Follow-up recommended to ensure resolution of the lung findings and   adenopathy to exclude other underlying pathology.  2.  No pulmonary embolus, aortic aneurysm or dissection.  3.  Hepatic steatosis.    Commonly reported imaging features of (COVID-19) pneumonia are present. Influenza pneumonia and organizing pneumonia as can be seen in the setting of drug toxicity and connective tissue disease can cause a similar imaging pattern.

## 2020-11-12 NOTE — PLAN OF CARE
[facility] DISCHARGE NOTE    Patient discharged to home at 2:12 PM via wheel chair. Accompanied by staff. Discharge instructions reviewed with patient, opportunity offered to ask questions. Prescriptions filled and sent with patient upon discharge. All belongings sent with patient.    Arnoldo Barrett RN

## 2020-11-13 ENCOUNTER — AMBULATORY - HEALTHEAST (OUTPATIENT)
Dept: FAMILY MEDICINE | Facility: CLINIC | Age: 35
End: 2020-11-13

## 2020-11-13 ENCOUNTER — COMMUNICATION - HEALTHEAST (OUTPATIENT)
Dept: FAMILY MEDICINE | Facility: CLINIC | Age: 35
End: 2020-11-13

## 2020-11-13 DIAGNOSIS — R06.02 SOB (SHORTNESS OF BREATH): ICD-10-CM

## 2020-11-16 ENCOUNTER — COMMUNICATION - HEALTHEAST (OUTPATIENT)
Dept: FAMILY MEDICINE | Facility: CLINIC | Age: 35
End: 2020-11-16

## 2020-11-18 ENCOUNTER — OFFICE VISIT - HEALTHEAST (OUTPATIENT)
Dept: FAMILY MEDICINE | Facility: CLINIC | Age: 35
End: 2020-11-18

## 2020-11-18 DIAGNOSIS — R79.89 ELEVATED LFTS: ICD-10-CM

## 2020-11-18 DIAGNOSIS — U07.1 INFECTION DUE TO 2019 NOVEL CORONAVIRUS: ICD-10-CM

## 2020-11-18 ASSESSMENT — PATIENT HEALTH QUESTIONNAIRE - PHQ9: SUM OF ALL RESPONSES TO PHQ QUESTIONS 1-9: 0

## 2020-11-30 ENCOUNTER — COMMUNICATION - HEALTHEAST (OUTPATIENT)
Dept: FAMILY MEDICINE | Facility: CLINIC | Age: 35
End: 2020-11-30

## 2020-11-30 ENCOUNTER — OFFICE VISIT - HEALTHEAST (OUTPATIENT)
Dept: FAMILY MEDICINE | Facility: CLINIC | Age: 35
End: 2020-11-30

## 2020-11-30 DIAGNOSIS — M79.672 BILATERAL FOOT PAIN: ICD-10-CM

## 2020-11-30 DIAGNOSIS — M79.671 BILATERAL FOOT PAIN: ICD-10-CM

## 2020-11-30 DIAGNOSIS — R74.8 ELEVATED LIVER ENZYMES: ICD-10-CM

## 2020-11-30 DIAGNOSIS — M79.661 RIGHT CALF PAIN: ICD-10-CM

## 2020-11-30 DIAGNOSIS — U07.1 CLINICAL DIAGNOSIS OF COVID-19: ICD-10-CM

## 2020-11-30 LAB
ALBUMIN SERPL-MCNC: 3.6 G/DL (ref 3.5–5)
ALP SERPL-CCNC: 66 U/L (ref 45–120)
ALT SERPL W P-5'-P-CCNC: 103 U/L (ref 0–45)
ANION GAP SERPL CALCULATED.3IONS-SCNC: 12 MMOL/L (ref 5–18)
AST SERPL W P-5'-P-CCNC: 121 U/L (ref 0–40)
BILIRUB SERPL-MCNC: 0.4 MG/DL (ref 0–1)
BUN SERPL-MCNC: 12 MG/DL (ref 8–22)
CALCIUM SERPL-MCNC: 9.7 MG/DL (ref 8.5–10.5)
CHLORIDE BLD-SCNC: 106 MMOL/L (ref 98–107)
CO2 SERPL-SCNC: 22 MMOL/L (ref 22–31)
CREAT SERPL-MCNC: 0.59 MG/DL (ref 0.6–1.1)
ERYTHROCYTE [DISTWIDTH] IN BLOOD BY AUTOMATED COUNT: 12.1 % (ref 11–14.5)
GFR SERPL CREATININE-BSD FRML MDRD: >60 ML/MIN/1.73M2
GLUCOSE BLD-MCNC: 79 MG/DL (ref 70–125)
HCT VFR BLD AUTO: 39.6 % (ref 35–47)
HGB BLD-MCNC: 13 G/DL (ref 12–16)
MCH RBC QN AUTO: 28.6 PG (ref 27–34)
MCHC RBC AUTO-ENTMCNC: 32.9 G/DL (ref 32–36)
MCV RBC AUTO: 87 FL (ref 80–100)
PLATELET # BLD AUTO: 382 THOU/UL (ref 140–440)
PMV BLD AUTO: 7.4 FL (ref 7–10)
POTASSIUM BLD-SCNC: 4.3 MMOL/L (ref 3.5–5)
PROT SERPL-MCNC: 6.8 G/DL (ref 6–8)
RBC # BLD AUTO: 4.56 MILL/UL (ref 3.8–5.4)
SODIUM SERPL-SCNC: 140 MMOL/L (ref 136–145)
WBC: 8.3 THOU/UL (ref 4–11)

## 2020-12-01 ENCOUNTER — HOSPITAL ENCOUNTER (OUTPATIENT)
Dept: ULTRASOUND IMAGING | Facility: HOSPITAL | Age: 35
Discharge: HOME | End: 2020-12-01
Attending: NURSE PRACTITIONER

## 2020-12-01 DIAGNOSIS — M79.672 BILATERAL FOOT PAIN: ICD-10-CM

## 2020-12-01 DIAGNOSIS — M79.661 RIGHT CALF PAIN: ICD-10-CM

## 2020-12-01 DIAGNOSIS — M79.671 BILATERAL FOOT PAIN: ICD-10-CM

## 2020-12-03 ENCOUNTER — COMMUNICATION - HEALTHEAST (OUTPATIENT)
Dept: FAMILY MEDICINE | Facility: CLINIC | Age: 35
End: 2020-12-03

## 2020-12-04 ENCOUNTER — AMBULATORY - HEALTHEAST (OUTPATIENT)
Dept: FAMILY MEDICINE | Facility: CLINIC | Age: 35
End: 2020-12-04

## 2020-12-04 DIAGNOSIS — R74.8 ELEVATED LIVER ENZYMES: ICD-10-CM

## 2020-12-21 ENCOUNTER — AMBULATORY - HEALTHEAST (OUTPATIENT)
Dept: LAB | Facility: CLINIC | Age: 35
End: 2020-12-21

## 2020-12-21 DIAGNOSIS — R74.8 ELEVATED LIVER ENZYMES: ICD-10-CM

## 2020-12-21 LAB
ALBUMIN SERPL-MCNC: 3.8 G/DL (ref 3.5–5)
ALP SERPL-CCNC: 61 U/L (ref 45–120)
ALT SERPL W P-5'-P-CCNC: 41 U/L (ref 0–45)
AST SERPL W P-5'-P-CCNC: 43 U/L (ref 0–40)
BILIRUB DIRECT SERPL-MCNC: 0.1 MG/DL
BILIRUB SERPL-MCNC: 0.3 MG/DL (ref 0–1)
PROT SERPL-MCNC: 6.9 G/DL (ref 6–8)

## 2020-12-24 ENCOUNTER — COMMUNICATION - HEALTHEAST (OUTPATIENT)
Dept: FAMILY MEDICINE | Facility: CLINIC | Age: 35
End: 2020-12-24

## 2020-12-24 DIAGNOSIS — Z78.9 USES BIRTH CONTROL: ICD-10-CM

## 2021-01-15 ENCOUNTER — HEALTH MAINTENANCE LETTER (OUTPATIENT)
Age: 36
End: 2021-01-15

## 2021-01-27 ENCOUNTER — COMMUNICATION - HEALTHEAST (OUTPATIENT)
Dept: FAMILY MEDICINE | Facility: CLINIC | Age: 36
End: 2021-01-27

## 2021-01-27 ENCOUNTER — AMBULATORY - HEALTHEAST (OUTPATIENT)
Dept: FAMILY MEDICINE | Facility: CLINIC | Age: 36
End: 2021-01-27

## 2021-01-27 DIAGNOSIS — L65.9 HAIR LOSS: ICD-10-CM

## 2021-02-01 ENCOUNTER — AMBULATORY - HEALTHEAST (OUTPATIENT)
Dept: LAB | Facility: CLINIC | Age: 36
End: 2021-02-01

## 2021-02-01 DIAGNOSIS — L65.9 HAIR LOSS: ICD-10-CM

## 2021-02-01 LAB
HGB BLD-MCNC: 14.3 G/DL (ref 12–16)
TSH SERPL DL<=0.005 MIU/L-ACNC: 4.76 UIU/ML (ref 0.3–5)

## 2021-05-19 ENCOUNTER — OFFICE VISIT - HEALTHEAST (OUTPATIENT)
Dept: FAMILY MEDICINE | Facility: CLINIC | Age: 36
End: 2021-05-19

## 2021-05-19 ENCOUNTER — RECORDS - HEALTHEAST (OUTPATIENT)
Dept: ADMINISTRATIVE | Facility: OTHER | Age: 36
End: 2021-05-19

## 2021-05-19 DIAGNOSIS — D22.9 ATYPICAL NEVUS: ICD-10-CM

## 2021-05-19 DIAGNOSIS — G43.909 MIGRAINE WITHOUT STATUS MIGRAINOSUS, NOT INTRACTABLE, UNSPECIFIED MIGRAINE TYPE: ICD-10-CM

## 2021-05-19 DIAGNOSIS — E66.01 MORBID OBESITY (H): ICD-10-CM

## 2021-05-19 DIAGNOSIS — Z00.00 ROUTINE GENERAL MEDICAL EXAMINATION AT A HEALTH CARE FACILITY: ICD-10-CM

## 2021-05-19 ASSESSMENT — MIFFLIN-ST. JEOR: SCORE: 1712.4

## 2021-05-27 VITALS — WEIGHT: 230 LBS | HEIGHT: 63 IN | BODY MASS INDEX: 40.74 KG/M2 | BODY MASS INDEX: 39.44 KG/M2

## 2021-05-27 VITALS
TEMPERATURE: 98 F | OXYGEN SATURATION: 99 % | HEART RATE: 96 BPM | DIASTOLIC BLOOD PRESSURE: 76 MMHG | RESPIRATION RATE: 20 BRPM | SYSTOLIC BLOOD PRESSURE: 110 MMHG

## 2021-05-28 NOTE — TELEPHONE ENCOUNTER
RN cannot approve Refill Request    RN can NOT refill this medication overdue for office visits and/or labs.    Valentin Mirza, [facility] Triage/Med Refill 5/14/2019    Requested Prescriptions   Pending Prescriptions Disp Refills     LORYNA, 28, 3-0.02 mg per tablet [Pharmacy Med Name: LORYNA 3MG/0.02MG TABLETS 28S] 84 tablet 0     Sig: TAKE 1 TABLET BY MOUTH DAILY       Oral Contraceptives Protocol Failed - 5/13/2019 12:51 PM        Failed - Visit with PCP or prescribing provider visit in last 12 months      Last office visit with prescriber/PCP: 4/17/2017 Sade Krueger MD OR same dept: Visit date not found OR same specialty: 4/17/2017 Sade Krueger MD  Last physical: 4/4/2018 Last MTM visit: Visit date not found   Next visit within 3 mo: Visit date not found  Next physical within 3 mo: Visit date not found  Prescriber OR PCP: Sade Krueger MD  Last diagnosis associated with med order: 1. Contraception  - LORYNA, 28, 3-0.02 mg per tablet [Pharmacy Med Name: LORYNA 3MG/0.02MG TABLETS 28S]; TAKE 1 TABLET BY MOUTH DAILY  Dispense: 84 tablet; Refill: 0    If protocol passes may refill for 12 months if within 3 months of last provider visit (or a total of 15 months).

## 2021-05-29 NOTE — PROGRESS NOTES
Assessment/Plan:     1. Routine general medical examination at a health care facility  Encouraged healthy lifestyle habits including regular exercise, healthy eating habits, and adequate calcium and vitamin D intake.  She will continue oral contraceptive pill for pregnancy prevention.  Will obtain fasting lipid.  Will await screening Pap smear and HPV results.  - Lipid [place]; Future  - Lipid [place]  - Gynecologic Cytology (PAP Smear)  - HPV High Risk DNA Cervical    2. Migraine without status migrainosus, not intractable, unspecified migraine type  Overall doing quite well, may continue ibuprofen as needed.  We discussed option of sumatriptan refill, she declines for now but will let us know if she desires this.  I would be very comfortable in prescribing.    3. Class 2 obesity without serious comorbidity with body mass index (BMI) of 38.0 to 38.9 in adult, unspecified obesity type  Encourage efforts at healthy lifestyle habits.  She declined.    4. Lump in lower outer quadrant of left breast  This is most consistent with changes, however because it is a significant change for her and is distressing to her, like it further with ultrasound and diagnostic mammogram.  - US Breast Limited (Focal) Left; Future  - Mammo Diagnostic Bilateral; Future       There are no Patient Instructions on file for this visit.     Return in about 1 year (around 5/30/2020) for Annual physical.          Subjective:     Mei Kim is a 34 y.o. female who presents for an annual exam.  Doing quite well overall over the past year.  Since her last visit her father unfortunately had an angiogram with stent placement, her sister was diagnosed with bipolar disorder, and her blood pressure.  This is been a distressing year for her in that regard but feels overall she is doing okay.  She is taking oral contraceptive pills with monthly cycling doing well.  History of migraine headaches in the past without aura, no recent issues, using  "ibuprofen for most of the headaches without any difficulty.  Has not used Imitrex in several years.  She has no additional concerns today.    She is single, no children.  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  Exercising doing beach body workouts.  3-4 alcoholic beverages per week.  She does not smoke.    Healthy Habits:   Healthy Diet: Yes  Regular Exercise: Yes  Sunscreen Use: Yes  Dental Visits Regularly: Yes  Seat Belt: Yes  Domestic abuse:   No    Health Maintenance reviewed:  Lipid Profile: due  Glucose Screen: NA  Colonoscopy: NA  Mammogram: NA    Gynecologic History  Patient's last menstrual period was 05/19/2019.  Contraception: OCP (estrogen/progesterone)  Last Pap: 4/4/18. Results were: normal with positive HPV of \"other\" type        Immunization History   Administered Date(s) Administered     DT (pediatric) 01/01/1998     HPV Quadrivalent 04/04/2007, 06/06/2007, 10/12/2007     Influenza, inj, historic,unspecified 10/01/2009, 10/05/2017     Tdap 12/30/2009     Immunization status: up to date and documented.    Current Outpatient Medications   Medication Sig Dispense Refill     LORYNA, 28, 3-0.02 mg per tablet TAKE 1 TABLET BY MOUTH DAILY 84 tablet 0     No current facility-administered medications for this visit.      History reviewed. No pertinent past medical history.  Past Surgical History:   Procedure Laterality Date     [place] LAP,CHOLECYSTECTOMY      Description: Cholecystectomy Laparoscopic;  Recorded: 10/30/2008;     Sulfa (sulfonamide antibiotics)  Family History   Problem Relation Age of Onset     Hypertension Mother      Heart disease Father      Bipolar disorder Sister      Breast cancer Maternal Aunt 58     Social History     Socioeconomic History     Marital status: Single     Spouse name: Not on file     Number of children: Not on file     Years of education: Not on file     Highest education level: Not on file   Occupational History " "    Not on file   Social Needs     Financial resource strain: Not on file     Food insecurity:     Worry: Not on file     Inability: Not on file     Transportation needs:     Medical: Not on file     Non-medical: Not on file   Tobacco Use     Smoking status: Never Smoker     Smokeless tobacco: Never Used   Substance and Sexual Activity     Alcohol use: Not on file     Drug use: Not on file     Sexual activity: Not on file   Lifestyle     Physical activity:     Days per week: Not on file     Minutes per session: Not on file     Stress: Not on file   Relationships     Social connections:     Talks on phone: Not on file     Gets together: Not on file     Attends Latter-day service: Not on file     Active member of club or organization: Not on file     Attends meetings of clubs or organizations: Not on file     Relationship status: Not on file     Intimate partner violence:     Fear of current or ex partner: Not on file     Emotionally abused: Not on file     Physically abused: Not on file     Forced sexual activity: Not on file   Other Topics Concern     Not on file   Social History Narrative     Not on file       Review of Systems  General:  Denies problem  Eyes: Denies problem  Ears/Nose/Throat: Denies problem  Cardiovascular: Denies problem  Respiratory:  Denies problem  Gastrointestinal:  Denies problem   Genitourinary: Denies problem  Musculoskeletal:  Denies problem  Skin: Denies problem  Neurologic: Denies problem  Psychiatric: Denies problem  Endocrine: Denies problem  Heme/Lymphatic: Denies problem   Allergic/Immunologic: Denies problem            Objective:        Vitals:    05/30/19 1047   BP: 118/74   Pulse: 88   Resp: 20   Temp: 97.8  F (36.6  C)   TempSrc: Oral   SpO2: 99%   Weight: (!) 222 lb 11.2 oz (101 kg)   Height: 5' 3.75\" (1.619 m)     Body mass index is 38.53 kg/m .    Physical Exam:  General Appearance: Alert, pleasant, appears stated age  Head: Normocephalic, without obvious abnormality  Eyes: " PERRL, conjunctiva/corneas clear, EOM's intact  Ears: Normal TM's and external ear canals, both ears  Nose: Nares normal, septum midline,mucosa normal, no drainage  Throat: Lips, mucosa, and tongue normal; teeth and gums normal; oropharynx is clear  Neck: Supple,without lymphadenopathy or thyromegally  Lungs: Clear to auscultation bilaterally, respirations unlabored  Heart: Regular rate and rhythm, no murmur   Breast:  Normal appearance.  No palpable masses, nipple discharge, or skin changes  Abdomen: Soft, non-tender, no masses, no organomegaly  Extremities: Extremities with strong and symmetric pulses, no cyanosis or edema  Skin: Skin color, texture normal, no rashes or lesions  Neurologic: Normal   Pelvic exam: Your pap smear results are normal, and you do not have HPV virus.  We should repeat a pap smear in 5 years.              This note has been dictated using voice recognition software. Any grammatical or context distortions are unintentional and inherent to the the software.

## 2021-05-30 ENCOUNTER — RECORDS - HEALTHEAST (OUTPATIENT)
Dept: ADMINISTRATIVE | Facility: CLINIC | Age: 36
End: 2021-05-30

## 2021-05-30 VITALS — HEIGHT: 68 IN | WEIGHT: 214 LBS | BODY MASS INDEX: 32.43 KG/M2

## 2021-05-31 NOTE — TELEPHONE ENCOUNTER
Refill Approved    Rx renewed per Medication Renewal Policy. Medication was last renewed on 5/14/19.    Last office visit: 5/30/19    Mariana Comer, [facility] Connection Triage/Med Refill 8/10/2019     Requested Prescriptions   Pending Prescriptions Disp Refills     drospirenone-ethinyl estradiol (LORYNA, 28,) 3-0.02 mg per tablet 84 tablet 0     Sig: Take 1 tablet by mouth daily.       Oral Contraceptives Protocol Passed - 8/9/2019  5:22 PM        Passed - Visit with PCP or prescribing provider visit in last 12 months      Last office visit with prescriber/PCP: Visit date not found OR same dept: Visit date not found OR same specialty: 4/17/2017 Sade Krueger MD  Last physical: Visit date not found Last MTM visit: Visit date not found   Next visit within 3 mo: Visit date not found  Next physical within 3 mo: Visit date not found  Prescriber OR PCP: Fany Galvan CNP  Last diagnosis associated with med order: 1. Contraception  - drospirenone-ethinyl estradiol (LORYNA, 28,) 3-0.02 mg per tablet; Take 1 tablet by mouth daily.  Dispense: 84 tablet; Refill: 0    If protocol passes may refill for 12 months if within 3 months of last provider visit (or a total of 15 months).

## 2021-05-31 NOTE — TELEPHONE ENCOUNTER
Refill Request  Did you contact pharmacy: Request recieved from patient's pharmacy via fax.  Medication name:   Requested Prescriptions     Pending Prescriptions Disp Refills     drospirenone-ethinyl estradiol (LORYNA, 28,) 3-0.02 mg per tablet 84 tablet 0     Sig: Take 1 tablet by mouth daily.     Who prescribed the medication: Fany Galvan  Pharmacy Name and Location: [facility])  Is patient out of medication: Information not provided.  Patient notified refills processed in 72 hours:  no  Okay to leave a detailed message: no

## 2021-06-01 ENCOUNTER — AMBULATORY - HEALTHEAST (OUTPATIENT)
Dept: LAB | Facility: CLINIC | Age: 36
End: 2021-06-01

## 2021-06-01 VITALS — HEIGHT: 64 IN | BODY MASS INDEX: 37.9 KG/M2 | WEIGHT: 222 LBS

## 2021-06-01 DIAGNOSIS — E66.01 MORBID OBESITY (H): ICD-10-CM

## 2021-06-01 DIAGNOSIS — Z00.00 ROUTINE GENERAL MEDICAL EXAMINATION AT A HEALTH CARE FACILITY: ICD-10-CM

## 2021-06-01 LAB
ALBUMIN SERPL-MCNC: 3.6 G/DL (ref 3.5–5)
ALP SERPL-CCNC: 73 U/L (ref 45–120)
ALT SERPL W P-5'-P-CCNC: 25 U/L (ref 0–45)
ANION GAP SERPL CALCULATED.3IONS-SCNC: 14 MMOL/L (ref 5–18)
AST SERPL W P-5'-P-CCNC: 30 U/L (ref 0–40)
BILIRUB SERPL-MCNC: 0.3 MG/DL (ref 0–1)
BUN SERPL-MCNC: 11 MG/DL (ref 8–22)
CALCIUM SERPL-MCNC: 8.9 MG/DL (ref 8.5–10.5)
CHLORIDE BLD-SCNC: 103 MMOL/L (ref 98–107)
CHOLEST SERPL-MCNC: 148 MG/DL
CO2 SERPL-SCNC: 21 MMOL/L (ref 22–31)
CREAT SERPL-MCNC: 0.6 MG/DL (ref 0.6–1.1)
FASTING STATUS PATIENT QL REPORTED: ABNORMAL
GFR SERPL CREATININE-BSD FRML MDRD: >60 ML/MIN/1.73M2
GLUCOSE BLD-MCNC: 104 MG/DL (ref 70–125)
HDLC SERPL-MCNC: 51 MG/DL
HIV 1+2 AB+HIV1 P24 AG SERPL QL IA: NEGATIVE
LDLC SERPL CALC-MCNC: 51 MG/DL
POTASSIUM BLD-SCNC: 4.5 MMOL/L (ref 3.5–5)
PROT SERPL-MCNC: 6.3 G/DL (ref 6–8)
SODIUM SERPL-SCNC: 138 MMOL/L (ref 136–145)
TRIGL SERPL-MCNC: 232 MG/DL

## 2021-06-02 LAB — HCV AB SERPL QL IA: NEGATIVE

## 2021-06-03 VITALS — HEIGHT: 64 IN | WEIGHT: 222.7 LBS | BODY MASS INDEX: 38.02 KG/M2

## 2021-06-07 ENCOUNTER — AMBULATORY - HEALTHEAST (OUTPATIENT)
Dept: FAMILY MEDICINE | Facility: CLINIC | Age: 36
End: 2021-06-07

## 2021-06-07 ENCOUNTER — TRANSFERRED RECORDS (OUTPATIENT)
Dept: HEALTH INFORMATION MANAGEMENT | Facility: CLINIC | Age: 36
End: 2021-06-07

## 2021-06-07 DIAGNOSIS — D22.5 ATYPICAL NEVUS OF FEMALE BREAST: ICD-10-CM

## 2021-06-07 ASSESSMENT — MIFFLIN-ST. JEOR: SCORE: 1712.4

## 2021-06-07 NOTE — TELEPHONE ENCOUNTER
Refill Approved    Rx renewed per Medication Renewal Policy. Medication was last renewed on 8/10/19.    Yudith Solis, [facility] Triage/Med Refill 4/17/2020     Requested Prescriptions   Pending Prescriptions Disp Refills     drospirenone-ethinyl estradioL (GLENIS) 3-0.02 mg per tablet [Pharmacy Med Name: DROSPIRENONE/ETHY EST 3/0.02MG T 28] 84 tablet 2     Sig: TAKE 1 TABLET BY MOUTH DAILY       Oral Contraceptives Protocol Passed - 4/16/2020  5:35 PM        Passed - Visit with PCP or prescribing provider visit in last 12 months      Last office visit with prescriber/PCP: 4/17/2017 Sade Krueger MD OR same dept: Visit date not found OR same specialty: 4/17/2017 Sade Krueger MD  Last physical: 5/30/2019 Last MTM visit: Visit date not found   Next visit within 3 mo: Visit date not found  Next physical within 3 mo: Visit date not found  Prescriber OR PCP: Sade Krueger MD  Last diagnosis associated with med order: 1. Uses birth control  - drospirenone-ethinyl estradioL (GLENIS) 3-0.02 mg per tablet [Pharmacy Med Name: DROSPIRENONE/ETHY EST 3/0.02MG T 28]; Take 1 tablet by mouth daily.  Dispense: 84 tablet; Refill: 2    If protocol passes may refill for 12 months if within 3 months of last provider visit (or a total of 15 months).

## 2021-06-09 NOTE — TELEPHONE ENCOUNTER
RN cannot approve Refill Request    RN can NOT refill this medication Protocol failed and NO refill given. Last office visit: 4/17/2017 Sade Krueger MD Last Physical: 5/30/2019 Last MTM visit: Visit date not found Last visit same specialty: 4/17/2017 Sade Krueger MD.  Next visit within 3 mo: Visit date not found  Next physical within 3 mo: Visit date not found      Yudith Solis, [facility] Triage/Med Refill 7/12/2020    Requested Prescriptions   Pending Prescriptions Disp Refills     drospirenone-ethinyl estradioL (GLENIS) 3-0.02 mg per tablet [Pharmacy Med Name: DROSPIRENONE/ETHY EST 3/0.02MG T 28] 84 tablet 0     Sig: TAKE 1 TABLET BY MOUTH DAILY       Oral Contraceptives Protocol Failed - 7/10/2020  4:56 PM        Failed - Visit with PCP or prescribing provider visit in last 12 months      Last office visit with prescriber/PCP: 4/17/2017 Sade Krueger MD OR same dept: Visit date not found OR same specialty: 4/17/2017 Sade Krueger MD  Last physical: 5/30/2019 Last MTM visit: Visit date not found   Next visit within 3 mo: Visit date not found  Next physical within 3 mo: Visit date not found  Prescriber OR PCP: Sade Krueger MD  Last diagnosis associated with med order: 1. Uses birth control  - drospirenone-ethinyl estradioL (GLENIS) 3-0.02 mg per tablet [Pharmacy Med Name: DROSPIRENONE/ETHY EST 3/0.02MG T 28]; TAKE 1 TABLET BY MOUTH DAILY  Dispense: 84 tablet; Refill: 0    If protocol passes may refill for 12 months if within 3 months of last provider visit (or a total of 15 months).

## 2021-06-10 LAB
LAB AP CHARGES (HE HISTORICAL CONVERSION): NORMAL
PATH REPORT.COMMENTS IMP SPEC: NORMAL
PATH REPORT.COMMENTS IMP SPEC: NORMAL
PATH REPORT.FINAL DX SPEC: NORMAL
PATH REPORT.GROSS SPEC: NORMAL
PATH REPORT.MICROSCOPIC SPEC OTHER STN: NORMAL
PATH REPORT.RELEVANT HX SPEC: NORMAL
PATHOLOGY SYNOPTIC REPORT: NORMAL
RESULT FLAG (HE HISTORICAL CONVERSION): NORMAL

## 2021-06-13 NOTE — TELEPHONE ENCOUNTER
Left message to call back for: upcoming appointment question  Information to relay to patient:  Message from Dr. Krueger:   Per dicontinue summary, future labs should be performed, however her the recommended labs were normal during her stay, and I prefer we delay for now.  We can determine during her video visit if a face to face evaluation or more urgent labs are necessary.  VJ             Does patient want to complete a video visit through [employer] or on her cell phone? Or would she prefer to complete a telephone visit?

## 2021-06-13 NOTE — TELEPHONE ENCOUNTER
Upcoming Appointment Question  When is the appointment: November 18th  What is your appointment for?: scheduled as office visit but patient needs a hospital follow up instead  DOS- 11.11.2020 to 11.12.2020  [place]  COVID Positive- also pneumonia  Who is your appointment scheduled with?: PCP only  What is your question/concern?: would like to change her appointment and come in this day for the INF- hospital told patient she would need lab work done  Okay to leave a detailed message?: Yes

## 2021-06-13 NOTE — TELEPHONE ENCOUNTER
I am happy to see her that date, however she should not be seen in person due to coronavirus infection.  The visit on 1118 at 310 should be changed to a video visit, and it should be a 40-minute visit (I know its at the end of my day and the 40 minutes doesn't matter, but it will allow me to see that I shouldn't add someone in right after her).  Thank!  DANN

## 2021-06-13 NOTE — TELEPHONE ENCOUNTER
This patient was on the reschedule list but disappeared, she is on your schedule 11/18/20 at 3:10. I want to double check that I should rechedule her.

## 2021-06-13 NOTE — TELEPHONE ENCOUNTER
Please change this visit to a 40 minute virtual visit.  Per dicontinue summary, future labs should be performed, however her the recommended labs were normal during her stay, and I prefer we delay for now.  We can determine during her video visit if a face to face evaluation or more urgent labs are necessary.  TONNYJ

## 2021-06-13 NOTE — TELEPHONE ENCOUNTER
Sincere there is not room to extend her appointment time to 40 minutes do you want this scheduled with a different provider?

## 2021-06-13 NOTE — TELEPHONE ENCOUNTER
I hadn't looked at the time of her visit.  Since it is at the end of my day, the current visit time should be fine.  Can you still extend it to 40 minutes?  I sometimes add patients at the end of the day, and that will remind me that I will need more time.  I believe changing the visit type to an INF visit will so this.Thanks!  DANN

## 2021-06-13 NOTE — PROGRESS NOTES
"Mei Kim is a 35 y.o. female who is being evaluated via a billable video visit.      The patient has been notified of following:     \"This video visit will be conducted via a call between you and your physician/provider. We have found that certain health care needs can be provided without the need for an in-person physical exam.  This service lets us provide the care you need with a video conversation.  If a prescription is necessary we can send it directly to your pharmacy.  If lab work is needed we can place an order for that and you can then stop by our lab to have the test done at a later time.    Video visits are billed at different rates depending on your insurance coverage. Please reach out to your insurance provider with any questions.    If during the course of the call the physician/provider feels a video visit is not appropriate, you will not be charged for this service.\"    Patient has given verbal consent to a Video visit? Yes  How would you like to obtain your AVS? AVS Preference: MyChart.  If dropped by the video visit, the video invitation should be sent to: Text to cell phone: [phone]  Will anyone else be joining your video visit? No        Video Start Time: 3:49      Hospital Follow-up Visit:    Hospital/Nursing Home/IP Rehab Facility: [facility]  Date of Admission: 11/11/2020  Date of Discharge: 11/13/2020  Reason(s) for Admission: COVID-19 infection, dyspnea    Mei montano developed coronavirus symptoms on November 3 with cough, fever, fatigue, myalgias, chills, and diarrhea.  She tested positive for coronavirus on November 4.  Symptoms progressed to the extent that she developed some shortness of breath as though she could not take a deep breath, chest felt tight, and she developed a cough.  On November 11 she presented to [facility] emergency room where D-dimer was elevated, CT scan showing groundglass opacities consistent with novel coronavirus.  " She was admitted due to her respiratory distress.  Noted to have elevation in ALT and AST of 88 and 100 respectively, started on dexamethasone with significant improvement.  She did not require oxygen.  Discharged with persisting cough but reduced shortness of breath.  Shortness of breath continues to improve daily.  Still a bit of shortness of breath with exertion but improving from previous, continued dry cough without any productivity to it.  Has not had a fever now since November 14.  Tolerating dexamethasone well without side effects.  Denies abdominal pain, nausea, or change in appetite.  Feels she is staying hydrated.  Fatigue persists but is improving.  She has not had any diarrhea for the past few days though she did have about 10 days of diarrhea during the illness.  Currently working at [employer], is planning to return to work on November 23.  He has been in touch with her occupational health team.    Was your hospitalization related to COVID-19? Yes   How are you feeling today? Better  In the past 24 hours have you had shortness of breath when speaking, walking, or climbing stairs? My breathing issues have improved  Do you have a cough? Yes, I have a cough but it's not worse  When is the last time you had a fever greater than 100? 11/14/2020  Are you having any other symptoms? Fatigue   Do you have any other stressors you would like to discuss with your provider? No       Was the patient in the ICU or did the patient experience delirium during hospitalization? No            Problems taking medications regularly:  None  Medication changes since discharge: None  Problems adhering to non-medication therapy:  None    Summary of hospitalization:  [facility] discharge summary reviewed  Diagnostic Tests/Treatments reviewed.  Follow up needed: Advised BMP and CBC at follow-up, however these were normal during recent inpatient stay and so we elected not to perform these today.  She will need  follow-up LFTs as her AST and ALT were both mildly elevated.  Other Healthcare Providers Involved in Patient s Care:         None  Update since discharge: improved.      Post Discharge Medication Reconciliation: discharge medications reconciled, continue medications without change.  Plan of care communicated with patient            GENERAL: Healthy, alert and no distress  EYES: Eyes grossly normal to inspection. No discharge or erythema, or obvious scleral/conjunctival abnormalities.  RESP: No audible wheeze, cough, or visible cyanosis.  No visible retractions or increased work of breathing.    NEURO: Cranial nerves grossly intact. Mentation and speech appropriate for age.  PSYCH: Mentation appears normal, affect normal/bright, judgement and insight intact, normal speech and appearance well-groomed    Video-Visit Details    Type of service:  Video Visit    Video End Time (time video stopped): 4:05  Originating Location (pt. Location): Home    Distant Location (provider location):  [facility]     Platform used for Video Visit: Agus Krueger MD

## 2021-06-13 NOTE — PROGRESS NOTES
Assessment and Plan:     1. Bilateral foot pain  US Venous Legs Bilateral   2. Right calf pain  US Venous Legs Bilateral   3. Elevated liver enzymes  HM2(CBC w/o Differential)    Comprehensive Metabolic Panel   4. Clinical diagnosis of COVID-19       Patient is recovering from COVID-19.  We will recheck liver enzymes.  Will obtain venous ultrasounds to rule out DVT.  Her foot pain and calf pain have improved.  If symptoms return, may consider referral to podiatry.  She is content with the plan.    Subjective:     Mei is a 35 y.o. female presenting to the clinic for concerns for bilateral foot pain.  Patient tested positive for COVID-19 on November 4.  Patient developed shortness of breath and presented to [facility] ER where D-dimer was elevated.  CT scan showed groundglass opacities consistent with Covid.  She was admitted due to respiratory distress.  She was noted to have an ALT and AST of 88 and 100 respectively.  She saw significant improvement with dexamethasone.  It was recommended for her to have repeat hemogram and CMP.  Patient states last weekend, she developed bilateral foot pain.  She describes the pain as a severe ache.  It lasted all week and resolved Friday night.  She also had right calf pain which lasted for 1 day.  She denies numbness and tingling of her extremities.  She has not noticed any redness, swelling, bruising.  She still experiences some mild shortness of breath with activity and a slight nonproductive cough.  Her smell and taste has returned.  No recent fever has been present.  She denies any personal or family history of blood clots.    Review of Systems: A complete 14 point review of systems was obtained and is negative or as stated in the history of present illness.    Social History     Socioeconomic History     Marital status: Single     Spouse name: Not on file     Number of children: Not on file     Years of education: Not on file     Highest education level: Not on file    Occupational History     Not on file   Social Needs     Financial resource strain: Not on file     Food insecurity     Worry: Not on file     Inability: Not on file     Transportation needs     Medical: Not on file     Non-medical: Not on file   Tobacco Use     Smoking status: Never Smoker     Smokeless tobacco: Never Used   Substance and Sexual Activity     Alcohol use: Not on file     Drug use: Not on file     Sexual activity: Not on file   Lifestyle     Physical activity     Days per week: Not on file     Minutes per session: Not on file     Stress: Not on file   Relationships     Social connections     Talks on phone: Not on file     Gets together: Not on file     Attends [employer] service: Not on file     Active member of club or organization: Not on file     Attends meetings of clubs or organizations: Not on file     Relationship status: Not on file     Intimate partner violence     Fear of current or ex partner: Not on file     Emotionally abused: Not on file     Physically abused: Not on file     Forced sexual activity: Not on file   Other Topics Concern     Not on file   Social History Narrative     Not on file       Active Ambulatory Problems     Diagnosis Date Noted     Migraine headache 12/09/2015     Obesity 12/09/2015     Resolved Ambulatory Problems     Diagnosis Date Noted     Abdominal Pain In The Right Upper Belly (RUQ)      Contact Dermatitis      Psoriasis      Headache      No Additional Past Medical History       Family History   Problem Relation Age of Onset     Hypertension Mother      Heart disease Father      Bipolar disorder Sister      Breast cancer Maternal Aunt 58       Objective:     /66 (Patient Site: Left Arm, Patient Position: Sitting, Cuff Size: Adult Large)   Pulse 78   Temp 98.2  F (36.8  C)   Wt (!) 225 lb 6.4 oz (102.2 kg)   BMI 38.99 kg/m      Patient is alert, in no obvious distress.   Skin: Warm, dry.  No lesions or rashes.  Skin turgor rapid return.   HEENT:   Head normocephalic, atraumatic.  Eyes normal. Ears normal.  Nose patent, mucosa pink.  Oropharynx mucosa pink.  No lesions or tonsillar enlargement.   Neck: Supple, no lymphadenopathy.   Lungs:  Clear to auscultation. Respirations even and unlabored.  No wheezing or rales noted.   Heart:  Regular rate and rhythm.  No murmurs, S3, S4, gallops, or rubs.    Abdomen: Soft, nontender.  No organomegaly. Bowel sounds normoactive. No guarding or masses noted.   Musculoskeletal:  Full ROM of extremities.  Krish's sign is negative.

## 2021-06-13 NOTE — TELEPHONE ENCOUNTER
Last Wednesday got tested at [facility] for covid and was positive, and feels like she is getting worse.  Symptoms today are fever, cough and shortness of breath and body aches and stuffy nose.  Mei states that she is having shortness of breath at rest and feels that he breathing is shallow.  Denies blue lips.      COVID 19 Nurse Triage Plan/Patient Instructions    Please be aware that novel coronavirus (COVID-19) may be circulating in the community. If you develop symptoms such as fever, cough, or SOB or if you have concerns about the presence of another infection including coronavirus (COVID-19), please contact your health care provider or visit [email or web address].     Disposition/Instructions    ED Visit recommended. Follow protocol based instructions.      Bring Your Own Device:  Please also bring your smart device(s) (smart phones, tablets, laptops) and their charging cables for your personal use and to communicate with your care team during your visit.      Thank you for taking steps to prevent the spread of this virus.  o Limit your contact with others.  o Wear a simple mask to cover your cough.  o Wash your hands well and often.    Resources    [facility]: About COVID-19: [email or web address]    CDC: What to Do If You're Sick: www.cdc.gov/coronavirus/2019-ncov/about/steps-when-sick.html    CDC: Ending Home Isolation: www.cdc.gov/coronavirus/2019-ncov/hcp/disposition-in-home-patients.html     CDC: Caring for Someone: www.cdc.gov/coronavirus/2019-ncov/if-you-are-sick/care-for-someone.html     [facility]: Interim Guidance for Hospital Discharge to Home: www.health.[facility].[place].us/diseases/coronavirus/hcp/hospdischarge.pdf    [facility] clinical trials (COVID-19 research studies): [email or web address]/[email or web address]     Below are the COVID-19 hotlines at the [place] Department of Health ([facility]). Interpreters are available.   o For health questions: Call 794-529-6285 or  1-672.942.5616 (7 a.m. to 7 p.m.)  o For questions about schools and childcare: Call 008-705-5861 or 1-330.217.6914 (7 a.m. to 7 p.m.)       Reason for Disposition    MODERATE difficulty breathing (e.g., speaks in phrases, SOB even at rest, pulse 100-120)    Additional Information    Negative: SEVERE difficulty breathing (e.g., struggling for each breath, speaks in single words)    Negative: Difficult to awaken or acting confused (e.g., disoriented, slurred speech)    Negative: Bluish (or gray) lips or face now    Negative: Shock suspected (e.g., cold/pale/clammy skin, too weak to stand, low BP, rapid pulse)    Negative: Sounds like a life-threatening emergency to the triager    Negative: SEVERE or constant chest pain or pressure (Exception: mild central chest pain, present only when coughing)    Protocols used: CORONAVIRUS (COVID-19) DIAGNOSED OR [id number] 8.4.20

## 2021-06-14 ENCOUNTER — OFFICE VISIT (OUTPATIENT)
Dept: URGENT CARE | Facility: URGENT CARE | Age: 36
End: 2021-06-14
Payer: COMMERCIAL

## 2021-06-14 ENCOUNTER — OFFICE VISIT - HEALTHEAST (OUTPATIENT)
Dept: FAMILY MEDICINE | Facility: CLINIC | Age: 36
End: 2021-06-14

## 2021-06-14 ENCOUNTER — TRANSFERRED RECORDS (OUTPATIENT)
Dept: HEALTH INFORMATION MANAGEMENT | Facility: CLINIC | Age: 36
End: 2021-06-14

## 2021-06-14 ENCOUNTER — COMMUNICATION - HEALTHEAST (OUTPATIENT)
Dept: FAMILY MEDICINE | Facility: CLINIC | Age: 36
End: 2021-06-14

## 2021-06-14 VITALS
SYSTOLIC BLOOD PRESSURE: 104 MMHG | BODY MASS INDEX: 41.11 KG/M2 | TEMPERATURE: 99.2 F | HEIGHT: 63 IN | WEIGHT: 232 LBS | OXYGEN SATURATION: 98 % | RESPIRATION RATE: 18 BRPM | HEART RATE: 100 BPM | DIASTOLIC BLOOD PRESSURE: 62 MMHG

## 2021-06-14 DIAGNOSIS — C43.9 SUPERFICIAL SPREADING MELANOMA (H): ICD-10-CM

## 2021-06-14 DIAGNOSIS — T81.31XA DEHISCENCE OF OPERATIVE WOUND, INITIAL ENCOUNTER: Primary | ICD-10-CM

## 2021-06-14 DIAGNOSIS — Z12.4 SCREENING FOR CERVICAL CANCER: ICD-10-CM

## 2021-06-14 PROCEDURE — 99213 OFFICE O/P EST LOW 20 MIN: CPT | Performed by: PHYSICIAN ASSISTANT

## 2021-06-14 ASSESSMENT — MIFFLIN-ST. JEOR
SCORE: 1712.4
SCORE: 1721.48

## 2021-06-15 NOTE — PROGRESS NOTES
"    Assessment & Plan     Dehiscence of operative wound, initial encounter  Closed with steri strip. Discussed wound care if steri strip does not hold. Can apply bacitracin and non-stick guaze until wound starts closing. Watch closely for signs of infection. Return to clinic if symptoms worsen or do not improve; otherwise follow up as needed                 BMI:   Estimated body mass index is 41.1 kg/m  as calculated from the following:    Height as of this encounter: 1.6 m (5' 3\").    Weight as of this encounter: 105.2 kg (232 lb).           Return in about 1 week (around 6/21/2021), or if symptoms worsen or fail to improve.    Lara Lee PA-C  [facility] [place]    Subjective   Chief Complaint   Patient presents with     Wound Check     6/7/21 Patient had a mole removed and today the stitches were taken out, now it popped open.       HPI     Wound check   Onset of symptoms was tody  Course of illness is same.    Severity moderate  Current and Associated symptoms: had sutures removed and incision opened up today   Treatment measures tried include None tried.  Predisposing factors include had mole removed 7 days ago.              Review of Systems   Constitutional, HEENT, cardiovascular, pulmonary, gi and gu systems are negative, except as otherwise noted.      Objective    /62 (BP Location: Right arm, Patient Position: Sitting, Cuff Size: Adult Large)   Pulse 100   Temp 99.2  F (37.3  C) (Tympanic)   Resp 18   Ht 1.6 m (5' 3\")   Wt 105.2 kg (232 lb)   SpO2 98%   BMI 41.10 kg/m    Body mass index is 41.1 kg/m .  Physical Exam  Constitutional:       General: She is not in acute distress.  Skin:     Comments: Left upper chest has open wound from recent biopsy. No signs of infection. Wound was closed with steri strip.    Neurological:      Mental Status: She is alert.                        "

## 2021-06-17 NOTE — PROGRESS NOTES
Assessment/Plan:     1. Routine general medical examination at a health care facility  Encouraged healthy lifestyle habits including regular exercise, healthy eating habits, and adequate calcium and vitamin D intake.  Will obtain future fasting lipids and screening for HIV and hepatitis C.  We will screen for glucose in the future at a fasting lab only visit as well.  Immunizations reviewed, Tdap administered today.  Continue oral contraceptive pill which is working well for her.  Will need future Pap for follow-up of HPV infection of unknown risk.  - Lipid [place] FASTING; Future  - HIV Antigen/Antibody Screening Cascade; Future  - Hepatitis C Antibody (Anti-HCV); Future    2. Atypical nevus  She will return to clinic at a future date for a punch biopsy of nevus left chest.    3. Migraine without status migrainosus, not intractable, unspecified migraine type  Stable without aura, therefore may continue oral contraceptive pill.  Managed with over-the-counter treatments.    4. Morbid obesity (H)  Encourage efforts in regards to healthy lifestyle habits.  And she has appointment scheduled with [facility] to address potential binge eating disorder as well.  I encouraged her in these efforts.  We will try comprehensive metabolic panel and lipid cascade to assess for complications.  Recent normal TSH.  - Comprehensive Metabolic Panel; Future  - Lipid [place] FASTING; Future     Patient Instructions   Schedule future visit for biopsy of the mole on your chest.    Schedule a future fasting lab only visit at which time we will check your kidney function, liver function, fasting blood sugar, and cholesterol.  We will also screen for hepatitis C and HIV per current preventive care guidelines.       Return in about 1 year (around 5/19/2022) for Annual Preventive Care Visit.          Subjective:     Mei Kim is a 36 y.o. female who presents for an annual exam.  She has a few concerns today.  First has noted a  change in a mole in her left upper chest over the last year.  She also notes struggles with what she believes may be binge eating disorder, no purging, has appointment scheduled with [facility] for further evaluation.  History of migraines, they occur a few times per year.  No aura.  Covid infection last year, now has experiencing hair loss, recent normal TSH and hemogram.  Oral contraception pill is working well for her, would like to continue.    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  Sleep is doing well.  Struggles with diet as noted.  She does not smoke.    Healthy Habits:   Healthy Diet: Yes  Sunscreen Use: Yes  Dental Visits Regularly: Yes  Seat Belt: Yes  Domestic abuse:   No    Health Maintenance reviewed:  Lipid Profile: Due, not fasting  Glucose Screen: Due, not fasting  Colonoscopy: N/A  Mammogram: N/A    Gynecologic History  No LMP recorded. (Menstrual status: Contraception).  Contraception: OCP (estrogen/progesterone)  Last Pap: 5/30/2019. Results were: Normal        Immunization History   Administered Date(s) Administered     DT (pediatric) 01/01/1998     HPV Quadrivalent 04/04/2007, 06/06/2007, 10/12/2007     Influenza, inj, historic,unspecified 10/01/2009, 10/05/2017     Tdap 12/30/2009, 05/19/2021     Immunization status: up to date and documented.  Due for and agreeable to Tdap today.    Current Outpatient Medications   Medication Sig Dispense Refill     drospirenone-ethinyl estradioL (GLENIS) 3-0.02 mg per tablet Take 1 tablet by mouth daily. 84 tablet 2     No current facility-administered medications for this visit.      No past medical history on file.  Past Surgical History:   Procedure Laterality Date     [place] LAP,CHOLECYSTECTOMY      Description: Cholecystectomy Laparoscopic;  Recorded: 10/30/2008;     Sulfa (sulfonamide antibiotics)  Family History   Problem Relation Age of Onset     Hypertension Mother      Heart disease Father      Bipolar disorder  Sister      Breast cancer Maternal Aunt 58     Social History     Socioeconomic History     Marital status: Single     Spouse name: Not on file     Number of children: Not on file     Years of education: Not on file     Highest education level: Not on file   Occupational History     Not on file   Social Needs     Financial resource strain: Not on file     Food insecurity     Worry: Not on file     Inability: Not on file     Transportation needs     Medical: Not on file     Non-medical: Not on file   Tobacco Use     Smoking status: Never Smoker     Smokeless tobacco: Never Used   Substance and Sexual Activity     Alcohol use: Not on file     Drug use: Not on file     Sexual activity: Not on file   Lifestyle     Physical activity     Days per week: Not on file     Minutes per session: Not on file     Stress: Not on file   Relationships     Social connections     Talks on phone: Not on file     Gets together: Not on file     Attends Orthodoxy service: Not on file     Active member of club or organization: Not on file     Attends meetings of clubs or organizations: Not on file     Relationship status: Not on file     Intimate partner violence     Fear of current or ex partner: Not on file     Emotionally abused: Not on file     Physically abused: Not on file     Forced sexual activity: Not on file   Other Topics Concern     Not on file   Social History Narrative     Not on file       Review of Systems  General:  Denies problem  Eyes: Denies problem  Ears/Nose/Throat: Denies problem  Cardiovascular: Denies problem  Respiratory:  Denies problem  Gastrointestinal:  Denies problem   Genitourinary: Denies problem  Musculoskeletal:  Denies problem  Skin: Denies problem  Neurologic: Denies problem  Psychiatric: Denies problem  Endocrine: Denies problem  Heme/Lymphatic: Denies problem   Allergic/Immunologic: Denies problem            Objective:        Vitals:    05/19/21 1603   BP: 110/76   Pulse: 96   Resp: 20   Temp: 98  F  "(36.7  C)   TempSrc: Oral   SpO2: 99%   Weight: (!) 230 lb (104.3 kg)   Height: 5' 3\" (1.6 m)     Body mass index is 40.74 kg/m .    Physical Exam:  General Appearance: Alert, pleasant, appears stated age  Head: Normocephalic, without obvious abnormality  Eyes: PERRL, conjunctiva/corneas clear, EOM's intact  Ears: Normal TM's and external ear canals, both ears  Nose: Nares normal, septum midline,mucosa normal, no drainage  Throat: Lips, mucosa, and tongue normal; teeth and gums normal; oropharynx is clear  Neck: Supple,without lymphadenopathy or thyromegally  Lungs: Clear to auscultation bilaterally, respirations unlabored  Heart: Regular rate and rhythm, no murmur   Breast:  Normal appearance.  No palpable masses, nipple discharge, or skin changes  Abdomen: Soft, non-tender, no masses, no organomegaly  Extremities: Extremities with strong and symmetric pulses, no cyanosis or edema  Skin: Skin color, texture normal, no rashes or lesions  Neurologic: Normal   Pelvic exam: Not performed               This note has been dictated using voice recognition software. Any grammatical or context distortions are unintentional and inherent to the the software.   "

## 2021-06-17 NOTE — PROGRESS NOTES
Assessment/Plan:     1. Routine general medical examination at a health care facility  Encouraged healthy lifestyle habits including regular exercise, healthy eating habits, and adequate calcium and vitamin D intake.  Will await screening Pap smear.  Will check fasting lipids and fasting glucose.  Immunizations up-to-date.  - Gynecologic Cytology (PAP Smear)  - Lipid [place] FASTING    2. Migraine headache  Think very well with oral contraceptive pills, does not have auras, continue.    3. Obesity  Encouraged her in regards to healthy lifestyle habits.  Encourage exercise which is working well.  Discussed body fat composition as opposed to weight.  Encourage her to reduce how frequently she is checking her weight and reduce the emotional attachment she has on that number in particular, to instead noticed how her body is healthier and how she is otherwise changing.  Offered referral to [place] weight loss clinic versus dietitian, she will consider.  Offered my support.  Will check conference of metabolic panel and thyroid cascade today.  - Comprehensive Metabolic Panel  - Thyroid Cascade         Subjective:     Mei Kim is a 33 y.o. female who presents for an annual exam.  Doing very well over the past few years.  Oral contraceptive pills working very well for her, having monthly cycling, good control.  These have also reduce her migraines as her migraines tend to be menstrual in nature.  She has Imitrex available but has not needed to use it for quite some time.    Struggling emotionally with her weight.  She is begun exercising regularly and is feeling good, feels like clothing is fitting better, but is not seeing change in her weight which is been frustrating.  Describes this as an emotional experience.  Finds that she is emotional about eating as well, describes her diet is about 75% good, her weaknesses chips and salty things.  Not interested in any specific programs in that regard.    She is  single, no children.  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  1-2 a colic beverages per week, no tobacco    Healthy Habits:   Healthy Diet: mostly  Regular Exercise: Yes  Sunscreen Use: Yes  Dental Visits Regularly: Yes  Seat Belt: Yes  Domestic abuse:   No    Health Maintenance reviewed:  Lipid Profile: due today  Glucose Screen: due  Colonoscopy: NA  Mammogram: NA    Gynecologic History  No LMP recorded.  Contraception: OCP (estrogen/progesterone)  Last Pap: 5/2104. Results were: normal, HPV not checked        Immunization History   Administered Date(s) Administered     DT (pediatric) 01/01/1998     HPV Quadrivalent 04/04/2007, 06/06/2007, 10/12/2007     Influenza, inj, historic,unspecified 10/01/2009, 10/05/2017     Tdap 12/30/2009     Immunization status: up to date and documented.    Current Outpatient Prescriptions   Medication Sig Dispense Refill     IMITREX 50 mg tablet TAKE 1 TABLET DAILY AS NEEDED FOR MIGRAINE RELIEF. MAY REPEAT ONCE EVERY 2 HOURS. MAX OF 4 TABLETS PER DAY 10 tablet 2     MERCED, 28, 3-0.02 mg per tablet TAKE 1 TABLET BY MOUTH EVERY DAY 84 tablet 0     No current facility-administered medications for this visit.      No past medical history on file.  Past Surgical History:   Procedure Laterality Date     [place] LAP,CHOLECYSTECTOMY      Description: Cholecystectomy Laparoscopic;  Recorded: 10/30/2008;     Sulfa (sulfonamide antibiotics)  No family history on file.  Social History     Social History     Marital status: Single     Spouse name: N/A     Number of children: N/A     Years of education: N/A     Occupational History     Not on file.     Social History Main Topics     Smoking status: Never Smoker     Smokeless tobacco: Never Used     Alcohol use Not on file     Drug use: Not on file     Sexual activity: Not on file     Other Topics Concern     Not on file     Social History Narrative       Review of Systems  General:  Denies  "problem  Eyes: Denies problem  Ears/Nose/Throat: Denies problem  Cardiovascular: Denies problem  Respiratory:  Denies problem  Gastrointestinal:  Denies problem   Genitourinary: Denies problem  Musculoskeletal:  Denies problem  Skin: Denies problem  Neurologic: Denies problem  Psychiatric: Denies problem  Endocrine: Denies problem  Heme/Lymphatic: Denies problem   Allergic/Immunologic: Denies problem            Objective:        Vitals:    04/04/18 0901   BP: 112/80   Pulse: 86   Resp: 16   Temp: 98.1  F (36.7  C)   TempSrc: Oral   SpO2: 98%   Weight: 222 lb (100.7 kg)   Height: 5' 3.5\" (1.613 m)     Body mass index is 38.71 kg/(m^2).    Physical Exam:  General Appearance: Alert, pleasant, appears stated age  Head: Normocephalic, without obvious abnormality  Eyes: PERRL, conjunctiva/corneas clear, EOM's intact  Ears: Normal TM's and external ear canals, both ears  Nose: Nares normal, septum midline,mucosa normal, no drainage  Throat: Lips, mucosa, and tongue normal; teeth and gums normal; oropharynx is clear  Neck: Supple,without lymphadenopathy or thyromegally  Lungs: Clear to auscultation bilaterally, respirations unlabored  Heart: Regular rate and rhythm, no murmur   Breast:  Normal appearance.  No palpable masses, nipple discharge, or skin changes  Abdomen: Soft, non-tender, no masses, no organomegaly  Extremities: Extremities with strong and symmetric pulses, no cyanosis or edema  Skin: Skin color, texture normal, no rashes or lesions  Neurologic: Normal   Pelvic exam: Normal external female genitalia.  Vaginal and cervical mucosa within normal limits on speculum exam.  Surepap obtained.                This note has been dictated using voice recognition software. Any grammatical or context distortions are unintentional and inherent to the the software.    "

## 2021-06-25 ENCOUNTER — TRANSFERRED RECORDS (OUTPATIENT)
Dept: HEALTH INFORMATION MANAGEMENT | Facility: CLINIC | Age: 36
End: 2021-06-25

## 2021-06-26 NOTE — PATIENT INSTRUCTIONS - HE
"Normal biopsy confirms the presence of melanoma, specifically \"superficial spreading melanoma\" and it appears it was excised completely.  Please schedule a visit with dermatology.  I recommend [facility] in [place] or [facility] in [place].  Please check your insurance company coverage to ensure the dermatologist you choose is in your network.  "

## 2021-06-26 NOTE — PROGRESS NOTES
"  Assessment/Plan:     1. Superficial spreading melanoma (H)  Sutures removed without complication, incision healing well with localized allergic reaction due to contact allergy from Band-Aid adhesive.  Advised that she schedule visit with dermatology for further evaluation and management along with ongoing risk factor modification.  - Ambulatory referral to Dermatology - [facility], [place]    2. Screening for cervical cancer, pap with postive HPV of unknown type  Await screening Pap smear results.  - Gynecologic Cytology (PAP Smear)      Patient Instructions   Normal biopsy confirms the presence of melanoma, specifically \"superficial spreading melanoma\" and it appears it was excised completely.  Please schedule a visit with dermatology.  I recommend [facility] in [place] or [facility] in [place].  Please check your insurance company coverage to ensure the dermatologist you choose is in your network.       Return in about 11 months (around 5/14/2022) for Annual Preventive Care Visit.      Subjective:      Mei Kim is a 36 y.o. female scented to clinic today for suture removal following nevus excision left upper chest.  Pathology reviewed indicating superficial spreading melanoma with 2 to 3 mm margins, completely excised.  Area is slightly itchy.  Also due for Pap smear due to fall previous unknown HPV type positive on prior Pap 2018 and 2019.    Current Outpatient Medications   Medication Sig Dispense Refill     drospirenone-ethinyl estradioL (GLENIS) 3-0.02 mg per tablet Take 1 tablet by mouth daily. 84 tablet 2     No current facility-administered medications for this visit.        Past Medical History, Family History, and Social History reviewed.  History reviewed. No pertinent past medical history.  Past Surgical History:   Procedure Laterality Date     [place] LAP,CHOLECYSTECTOMY      Description: Cholecystectomy Laparoscopic;  Recorded: 10/30/2008; " "    Sulfa (sulfonamide antibiotics)  Family History   Problem Relation Age of Onset     Hypertension Mother      Heart disease Father      Bipolar disorder Sister      Breast cancer Maternal Aunt 58     Social History     Socioeconomic History     Marital status: Single     Spouse name: Not on file     Number of children: Not on file     Years of education: Not on file     Highest education level: Not on file   Occupational History     Not on file   Social Needs     Financial resource strain: Not on file     Food insecurity     Worry: Not on file     Inability: Not on file     Transportation needs     Medical: Not on file     Non-medical: Not on file   Tobacco Use     Smoking status: Never Smoker     Smokeless tobacco: Never Used   Substance and Sexual Activity     Alcohol use: Not on file     Drug use: Not on file     Sexual activity: Not on file   Lifestyle     Physical activity     Days per week: Not on file     Minutes per session: Not on file     Stress: Not on file   Relationships     Social connections     Talks on phone: Not on file     Gets together: Not on file     Attends Faith service: Not on file     Active member of club or organization: Not on file     Attends meetings of clubs or organizations: Not on file     Relationship status: Not on file     Intimate partner violence     Fear of current or ex partner: Not on file     Emotionally abused: Not on file     Physically abused: Not on file     Forced sexual activity: Not on file   Other Topics Concern     Not on file   Social History Narrative     Not on file         Review of systems is as stated in HPI, and the remainder of the 10 system review is otherwise negative.    Objective:     Vitals:    06/14/21 1410   BP: 124/80   Pulse: 92   Resp: 20   Temp: 97.7  F (36.5  C)   TempSrc: Temporal   SpO2: 98%   Weight: (!) 230 lb (104.3 kg)   Height: 5' 3\" (1.6 m)    Body mass index is 40.74 kg/m .    Mild erythema noted at site of bandage adhesive.  " Very slight erythema consistent with contact dermatitis at incision site as well, no cellulitis and healing well.  3 simple sutures removed without complication.  Because she is having a localized contact dermatitis, we like to avoid Steri-Strip placement today.    Normal external female genitalia.  Vaginal and cervical mucosa within normal limits on speculum exam.  Surepap obtained.       This note has been dictated using voice recognition software. Any grammatical or context distortions are unintentional and inherent to the the software.

## 2021-06-26 NOTE — PROGRESS NOTES
Assessment/Plan:     1. 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  Discussed routine postbiopsy cares.  Return to clinic in 7 days for suture removal.  - lidocaine-EPINEPHrine (PF) 1 %-1:200,000 injection 1.5 mL      There are no Patient Instructions on file for this visit.     Return in about 1 week (around 6/14/2021) for Suture removal and Pap.      Subjective:      Mei Kim is a 36 y.o. female presented to clinic today for removal of atypical nevus left chest.  Irregular pigmentation and patient noted changes at time of physical earlier this year, requiring biopsy.    Current Outpatient Medications   Medication Sig Dispense Refill     drospirenone-ethinyl estradioL (GLENIS) 3-0.02 mg per tablet Take 1 tablet by mouth daily. 84 tablet 2     Current Facility-Administered Medications   Medication Dose Route Frequency Provider Last Rate Last Admin     lidocaine-EPINEPHrine (PF) 1 %-1:200,000 injection 1.5 mL  1.5 mL Intradermal Once Sade Krueger MD           Past Medical History, Family History, and Social History reviewed.  History reviewed. No pertinent past medical history.  Past Surgical History:   Procedure Laterality Date     AR LAP,CHOLECYSTECTOMY      Description: Cholecystectomy Laparoscopic;  Recorded: 10/30/2008;     Sulfa (sulfonamide antibiotics)  Family History   Problem Relation Age of Onset     Hypertension Mother      Heart disease Father      Bipolar disorder Sister      Breast cancer Maternal Aunt 58     Social History     Socioeconomic History     Marital status: Single     Spouse name: Not on file     Number of children: Not on file     Years of education: Not on file     Highest education level: Not on file   Occupational History     Not on file   Social Needs     Financial resource strain: Not on file     Food insecurity     Worry: Not on file     Inability: Not on file     Transportation needs     Medical: Not on file     Non-medical: Not on file   Tobacco Use  "    Smoking status: Never Smoker     Smokeless tobacco: Never Used   Substance and Sexual Activity     Alcohol use: Not on file     Drug use: Not on file     Sexual activity: Not on file   Lifestyle     Physical activity     Days per week: Not on file     Minutes per session: Not on file     Stress: Not on file   Relationships     Social connections     Talks on phone: Not on file     Gets together: Not on file     Attends Shinto service: Not on file     Active member of club or organization: Not on file     Attends meetings of clubs or organizations: Not on file     Relationship status: Not on file     Intimate partner violence     Fear of current or ex partner: Not on file     Emotionally abused: Not on file     Physically abused: Not on file     Forced sexual activity: Not on file   Other Topics Concern     Not on file   Social History Narrative     Not on file         Review of systems is as stated in HPI, and the remainder of the 10 system review is otherwise negative.    Objective:     Vitals:    06/07/21 1625   BP: 126/82   Pulse: 98   Resp: 20   Temp: 98  F (36.7  C)   SpO2: 98%   Weight: (!) 230 lb (104.3 kg)   Height: 5' 3\" (1.6 m)    Body mass index is 40.74 kg/m .    6 to 7 mm, well demarcated, irregularly hyperpigmented needed nevus left upper chest.  There is some slight smudging with slight tan discoloration extending inferiorly as well.    After discussing risks, benefits, and alternatives and after obtaining verbal informed consent, 1.5 mL of lidocaine with epinephrine was infiltrated at the base of the lesion.  After confirming adequate anesthesia and after iodine prep, with sterile technique, an 8 mm punch biopsy was performed of the lesion removing it in its entirety with a 1 to 2 mm margin.  3 simple sutures with 5-0 Ethilon were used to close the wound.  Adequate hemostasis obtained.  Estimated blood loss less than 1 mL.  Lesion sent for pathologic review.  No complications.  Patient " tolerated procedure well.      This note has been dictated using voice recognition software. Any grammatical or context distortions are unintentional and inherent to the the software.

## 2021-07-02 ENCOUNTER — TRANSFERRED RECORDS (OUTPATIENT)
Dept: HEALTH INFORMATION MANAGEMENT | Facility: CLINIC | Age: 36
End: 2021-07-02

## 2021-07-08 ENCOUNTER — COMMUNICATION - HEALTHEAST (OUTPATIENT)
Dept: FAMILY MEDICINE | Facility: CLINIC | Age: 36
End: 2021-07-08

## 2021-07-08 ENCOUNTER — TRANSFERRED RECORDS (OUTPATIENT)
Dept: HEALTH INFORMATION MANAGEMENT | Facility: CLINIC | Age: 36
End: 2021-07-08

## 2021-07-08 NOTE — TELEPHONE ENCOUNTER
Telephone Encounter by Forrest Lucas CMA at 7/8/2021  4:21 PM     Author: Forrest Lucas CMA Service: -- Author Type: Certified Medical Assistant    Filed: 7/8/2021  4:22 PM Encounter Date: 7/8/2021 Status: Signed    : Forrest Lucas CMA (Certified Medical Assistant)       Records faxed

## 2021-07-08 NOTE — TELEPHONE ENCOUNTER
Telephone Encounter by Forrest Lucas CMA at 7/8/2021  3:45 PM     Author: Forrest Lucas CMA Service: -- Author Type: Certified Medical Assistant    Filed: 7/8/2021  3:45 PM Encounter Date: 7/8/2021 Status: Signed    : Forrest Lucas CMA (Certified Medical Assistant)       Form coming in for DR Krueger patient.

## 2021-07-27 ENCOUNTER — TRANSFERRED RECORDS (OUTPATIENT)
Dept: HEALTH INFORMATION MANAGEMENT | Facility: CLINIC | Age: 36
End: 2021-07-27

## 2021-08-12 ENCOUNTER — PATIENT OUTREACH (OUTPATIENT)
Dept: FAMILY MEDICINE | Facility: CLINIC | Age: 36
End: 2021-08-12

## 2021-08-12 DIAGNOSIS — R87.810 CERVICAL HIGH RISK HPV (HUMAN PAPILLOMAVIRUS) TEST POSITIVE: ICD-10-CM

## 2021-08-17 ENCOUNTER — TRANSFERRED RECORDS (OUTPATIENT)
Dept: HEALTH INFORMATION MANAGEMENT | Facility: CLINIC | Age: 36
End: 2021-08-17

## 2021-09-19 ENCOUNTER — HEALTH MAINTENANCE LETTER (OUTPATIENT)
Age: 36
End: 2021-09-19

## 2021-11-19 NOTE — PROGRESS NOTES
Assessment/Plan:     1. Acute bronchitis  Review nature of condition.  Likely mild sinus infection as well the less clearly established.  Prescription provided for azithromycin for 5 day course.  Reviewed symptomatic and over-the-counter measures.  Notify with persistence or worsening.    2. Contraception  Refill provided of oral contraceptive pill.  - drospirenone-ethinyl estradiol (VESTURA, 28,) 3-0.02 mg per tablet; TAKE ONE TABLET BY MOUTH ONCE DAILY  Dispense: 84 tablet; Refill: 3      Subjective:      Mei Kim is a 32 y.o. female presented clinic today for evaluation of acute illness.  10 days ago developed achiness that was generalized, subjective fevers, and a sore throat.  She developed a cough and sinus pressure.  Both cough and nasal drainage productive of green phlegm.  She has had some bilateral aching in her jaw and ear area.  No further fevers.  No nausea, vomiting, or diarrhea.  Appetite reduced but stable.  No change in urine patterns.  Using over-the-counter Robitussin-DM without much improvement in symptoms.  Sister with similar illness about 3 weeks ago.  Patient did not receive flu shot this year.    Current Outpatient Prescriptions   Medication Sig Dispense Refill     drospirenone-ethinyl estradiol (VESTURA, 28,) 3-0.02 mg per tablet TAKE ONE TABLET BY MOUTH ONCE DAILY 84 tablet 3     azithromycin (ZITHROMAX) 250 MG tablet Take 1 tablet (250 mg total) by mouth daily. Take 2 tabs by mouth on day one, then one tab by mouth daily days two through five 6 tablet 0     IMITREX 50 mg tablet TAKE 1 TABLET DAILY AS NEEDED FOR MIGRAINE RELIEF. MAY REPEAT ONCE EVERY 2 HOURS. MAX OF 4 TABLETS PER DAY 10 tablet 2     No current facility-administered medications for this visit.        Past Medical History, Family History, and Social History reviewed.  No past medical history on file.  Past Surgical History:   Procedure Laterality Date     [place] LAP,CHOLECYSTECTOMY      Description: Cholecystectomy  "Laparoscopic;  Recorded: 10/30/2008;     Sulfa (sulfonamide antibiotics)  No family history on file.  Social History     Social History     Marital status: Single     Spouse name: N/A     Number of children: N/A     Years of education: N/A     Occupational History     Not on file.     Social History Main Topics     Smoking status: Never Smoker     Smokeless tobacco: Not on file     Alcohol use Not on file     Drug use: Not on file     Sexual activity: Not on file     Other Topics Concern     Not on file     Social History Narrative         Review of systems is as stated in HPI, and the remainder of the 10 system review is otherwise negative.    Objective:     Vitals:    04/17/17 1537   BP: 122/80   Patient Site: Right Arm   Patient Position: Sitting   Cuff Size: Adult Large   Pulse: 90   Resp: 20   Temp: 98  F (36.7  C)   TempSrc: Oral   SpO2: 98%   Weight: 214 lb (97.1 kg)   Height: 5' 8\" (1.727 m)    Body mass index is 32.54 kg/(m^2).    Alert female.  Pupils are equal, round, and reactive to light.  Tympanic membranes pearly and translucent.  Nasal mucosa with mild erythema, no drainage seen.  No focal sinus tenderness.  Neck supple without adenopathy.  Heart is with regular rate and rhythm.  Lungs are clear migrated throughout, no rhonchi or wheezes.  Extremities are without edema or rashes.      This note has been dictated using voice recognition software. Any grammatical or context distortions are unintentional and inherent to the the software.     " [phone]

## 2022-01-18 VITALS
WEIGHT: 230 LBS | RESPIRATION RATE: 20 BRPM | SYSTOLIC BLOOD PRESSURE: 124 MMHG | TEMPERATURE: 97.7 F | OXYGEN SATURATION: 98 % | DIASTOLIC BLOOD PRESSURE: 80 MMHG | HEART RATE: 92 BPM | BODY MASS INDEX: 40.75 KG/M2 | HEIGHT: 63 IN

## 2022-01-18 VITALS
HEART RATE: 96 BPM | HEIGHT: 63 IN | TEMPERATURE: 98 F | DIASTOLIC BLOOD PRESSURE: 76 MMHG | OXYGEN SATURATION: 99 % | SYSTOLIC BLOOD PRESSURE: 110 MMHG | RESPIRATION RATE: 20 BRPM | BODY MASS INDEX: 40.75 KG/M2 | WEIGHT: 230 LBS

## 2022-01-18 VITALS
DIASTOLIC BLOOD PRESSURE: 82 MMHG | OXYGEN SATURATION: 98 % | BODY MASS INDEX: 40.75 KG/M2 | HEIGHT: 63 IN | HEART RATE: 98 BPM | SYSTOLIC BLOOD PRESSURE: 126 MMHG | RESPIRATION RATE: 20 BRPM | WEIGHT: 230 LBS | TEMPERATURE: 98 F

## 2022-01-18 VITALS
WEIGHT: 225.4 LBS | BODY MASS INDEX: 39.93 KG/M2 | HEART RATE: 78 BPM | DIASTOLIC BLOOD PRESSURE: 66 MMHG | SYSTOLIC BLOOD PRESSURE: 112 MMHG | TEMPERATURE: 98.2 F

## 2022-01-18 ASSESSMENT — PATIENT HEALTH QUESTIONNAIRE - PHQ9: SUM OF ALL RESPONSES TO PHQ QUESTIONS 1-9: 0

## 2022-05-15 PROBLEM — U07.1 PNEUMONIA DUE TO 2019 NOVEL CORONAVIRUS: Status: RESOLVED | Noted: 2020-11-11 | Resolved: 2022-05-15

## 2022-05-15 PROBLEM — Z85.820 HISTORY OF MELANOMA: Status: ACTIVE | Noted: 2021-06-14

## 2022-05-15 PROBLEM — E66.01 MORBID OBESITY (H): Status: ACTIVE | Noted: 2021-05-19

## 2022-05-15 PROBLEM — J12.82 PNEUMONIA DUE TO 2019 NOVEL CORONAVIRUS: Status: RESOLVED | Noted: 2020-11-11 | Resolved: 2022-05-15

## 2022-05-15 PROBLEM — R73.01 IMPAIRED FASTING GLUCOSE: Status: ACTIVE | Noted: 2021-06-02

## 2022-05-15 PROBLEM — C43.9 SUPERFICIAL SPREADING MELANOMA (H): Status: ACTIVE | Noted: 2021-06-14

## 2022-05-16 ENCOUNTER — OFFICE VISIT (OUTPATIENT)
Dept: FAMILY MEDICINE | Facility: CLINIC | Age: 37
End: 2022-05-16
Payer: COMMERCIAL

## 2022-05-16 VITALS
RESPIRATION RATE: 18 BRPM | SYSTOLIC BLOOD PRESSURE: 134 MMHG | TEMPERATURE: 97.8 F | DIASTOLIC BLOOD PRESSURE: 84 MMHG | HEART RATE: 79 BPM | HEIGHT: 64 IN | OXYGEN SATURATION: 98 % | BODY MASS INDEX: 43.16 KG/M2 | WEIGHT: 252.8 LBS

## 2022-05-16 DIAGNOSIS — Z85.820 HISTORY OF MELANOMA: ICD-10-CM

## 2022-05-16 DIAGNOSIS — E66.01 MORBID OBESITY (H): ICD-10-CM

## 2022-05-16 DIAGNOSIS — Z00.00 ROUTINE PHYSICAL EXAMINATION: Primary | ICD-10-CM

## 2022-05-16 DIAGNOSIS — R87.810 CERVICAL HIGH RISK HPV (HUMAN PAPILLOMAVIRUS) TEST POSITIVE: ICD-10-CM

## 2022-05-16 DIAGNOSIS — R73.01 IMPAIRED FASTING GLUCOSE: ICD-10-CM

## 2022-05-16 DIAGNOSIS — G43.909 MIGRAINE WITHOUT STATUS MIGRAINOSUS, NOT INTRACTABLE, UNSPECIFIED MIGRAINE TYPE: ICD-10-CM

## 2022-05-16 DIAGNOSIS — Z12.4 CERVICAL CANCER SCREENING: ICD-10-CM

## 2022-05-16 LAB
CHOLEST SERPL-MCNC: 196 MG/DL
FASTING STATUS PATIENT QL REPORTED: YES
FASTING STATUS PATIENT QL REPORTED: YES
GLUCOSE BLD-MCNC: 101 MG/DL (ref 70–125)
HBA1C MFR BLD: 5.6 % (ref 0–5.6)
HDLC SERPL-MCNC: 48 MG/DL
LDLC SERPL CALC-MCNC: 109 MG/DL
TRIGL SERPL-MCNC: 193 MG/DL

## 2022-05-16 PROCEDURE — 82947 ASSAY GLUCOSE BLOOD QUANT: CPT | Performed by: FAMILY MEDICINE

## 2022-05-16 PROCEDURE — 83036 HEMOGLOBIN GLYCOSYLATED A1C: CPT | Performed by: FAMILY MEDICINE

## 2022-05-16 PROCEDURE — 36415 COLL VENOUS BLD VENIPUNCTURE: CPT | Performed by: FAMILY MEDICINE

## 2022-05-16 PROCEDURE — 87624 HPV HI-RISK TYP POOLED RSLT: CPT | Performed by: FAMILY MEDICINE

## 2022-05-16 PROCEDURE — 80061 LIPID PANEL: CPT | Performed by: FAMILY MEDICINE

## 2022-05-16 PROCEDURE — 99395 PREV VISIT EST AGE 18-39: CPT | Performed by: FAMILY MEDICINE

## 2022-05-16 PROCEDURE — G0123 SCREEN CERV/VAG THIN LAYER: HCPCS | Performed by: FAMILY MEDICINE

## 2022-05-16 NOTE — PROGRESS NOTES
Assessment/Plan:     Routine physical examination  Advised initiation of prenatal vitamin.  Advise COVID-vaccine, she declines today.  We will await follow-up Pap smear results.  We will screen for diabetes and for dyslipidemia.    Impaired fasting glucose  Will screen for diabetes today with A1c and glucose.  We will check other cardiovascular risk factors with lipid cascade.  - Hemoglobin A1c; Future  - Glucose; Future  - Lipid panel reflex to direct LDL Fasting; Future    Morbid obesity (H)  Encouraged healthy lifestyle habits.    Migraine without status migrainosus, not intractable, unspecified migraine type  Stable, managed with over-the-counter treatments.    History of melanoma  Followed by dermatology regularly.    Cervical high risk HPV (human papillomavirus) test positive  Cervical cancer screening  We will await follow-up Pap with HPV screening.  - Pap Screen with HPV - recommended age 30 - 65 years       Patient Instructions     Preventive Health Recommendations  Female Ages 26 - 39  Yearly exam:   See your health care provider every year in order to    Review health changes.     Discuss preventive care.      Review your medicines if you your doctor has prescribed any.    Until age 30: Get a Pap test every three years (more often if you have had an abnormal result).    After age 30: Talk to your doctor about whether you should have a Pap test every 3 years or have a Pap test with HPV screening every 5 years.   You do not need a Pap test if your uterus was removed (hysterectomy) and you have not had cancer.  You should be tested each year for STDs (sexually transmitted diseases), if you're at risk.   Talk to your provider about how often to have your cholesterol checked.  If you are at risk for diabetes, you should have a diabetes test (fasting glucose).  Shots: Get a flu shot each year. Get a tetanus shot every 10 years.   Nutrition:     Eat at least 5 servings of fruits and vegetables each day.    Eat  whole-grain bread, whole-wheat pasta and brown rice instead of white grains and rice.    Get adequate Calcium and Vitamin D.     Lifestyle    Exercise at least 150 minutes a week (30 minutes a day, 5 days of the week). This will help you control your weight and prevent disease.    Limit alcohol to one drink per day.    No smoking.     Wear sunscreen to prevent skin cancer.    See your dentist every six months for an exam and cleaning.         Return in about 1 year (around 5/16/2023) for Annual Preventive Care Visit.         Subjective:     Mei Kim is a 37 year old female who presents for an annual exam.     Doing well over the last year.  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  History of migraines, managing with caffeine and Advil as needed, has not had one in the last year.  Fasting for follow-up of impaired fasting glucose.  Normal Pap smear positive for HPV 8 last year, call without high-grade changes, due for follow-up Pap today.  She stopped her oral contraceptive pill in November, menses are regular, is engaged to be  in October and planning pregnancy.  Has not yet started prenatal vitamin.  Exercising by walking her dog.  Diet overall is stable.    Healthy Habits:     Getting at least 3 servings of Calcium per day:  Yes    Bi-annual eye exam:  NO    Dental care twice a year:  Yes    Sleep apnea or symptoms of sleep apnea:  None    Diet:  Regular (no restrictions)    Frequency of exercise:  2-3 days/week    Duration of exercise:  15-30 minutes    Taking medications regularly:  Not Applicable    Medication side effects:  Not applicable    PHQ-2 Total Score: 0    Additional concerns today:  No      Healthy Habits:   Healthy Diet: Yes  Regular Exercise: Yes  Sunscreen Use: Yes  Dental Visits Regularly: Yes  Seat Belt: Yes  Domestic abuse:   No    Health Maintenance  reviewed:  Lipid Profile: Due today  Glucose Screen: Due today  Colonoscopy: N/A  Mammogram: N/A    Gynecologic History  No LMP recorded.  Contraception: None -- is she planning pregnancy?  Yes  Last Pap: June 2021. Results were: Normal, positive for other HPV.  Colposcopy with squamous metaplasia and atypia without evidence of high-grade dysplasia.  Due for follow-up Pap.        Immunization History   Administered Date(s) Administered     Flu, Unspecified 10/05/2017     HPV Quadrivalent 04/04/2007, 06/06/2007, 10/12/2007     Influenza Vaccine IM > 6 months Valent IIV4 (Alfuria,Fluzone) 10/17/2019, 10/02/2020, 10/02/2021     Tdap (Adacel,Boostrix) 12/30/2009, 05/19/2021     Immunization status: up to date; declines COVID vaccine series    No current outpatient medications on file.     Past Medical History:   Diagnosis Date     Cervical high risk HPV (human papillomavirus) test positive 4/4/2018 2009, 2011, 2014 NIL paps 4/4/18 NIL pap, +HR HPV (not 16/18) 5/30/19 NIL pap, +HR HPV (not 16/18) 6/14/21 NIL pap, +HR HPV (not 16/18). Plan: colposcopy due before 9/14/21 / await provider     Migraine headache      Pneumonia due to 2019 novel coronavirus 11/11/2020     Past Surgical History:   Procedure Laterality Date     CHOLECYSTECTOMY       ZZC LAP,CHOLECYSTECTOMY/EXPLORE      Description: Cholecystectomy Laparoscopic;  Recorded: 10/30/2008;     Sulfa drugs  Family History   Problem Relation Age of Onset     Hypertension Mother      Heart Disease Father      Bipolar Disorder Sister      Bipolar Disorder Sister      Breast Cancer Maternal Aunt 58.00     Social History     Socioeconomic History     Marital status: Single     Spouse name: Not on file     Number of children: Not on file     Years of education: Not on file     Highest education level: Not on file   Occupational History     Not on file   Tobacco Use     Smoking status: Never Smoker     Smokeless tobacco: Never Used   Substance and Sexual Activity      "Alcohol use: Not on file     Drug use: Not on file     Sexual activity: Not on file   Other Topics Concern     Parent/sibling w/ CABG, MI or angioplasty before 65F 55M? Not Asked   Social History Narrative     Not on file     Social Determinants of Health     Financial Resource Strain: Not on file   Food Insecurity: Not on file   Transportation Needs: Not on file   Physical Activity: Not on file   Stress: Not on file   Social Connections: Not on file   Intimate Partner Violence: Not on file   Housing Stability: Not on file       Review of Systems  General:  Denies problem  Eyes: Denies problem  Ears/Nose/Throat: Denies problem  Cardiovascular: Denies problem  Respiratory:  Denies problem  Gastrointestinal:  Denies problem   Genitourinary: Denies problem  Musculoskeletal:  Denies problem       Objective:     /84   Pulse 79   Temp 97.8  F (36.6  C) (Oral)   Resp 18   Ht 1.613 m (5' 3.5\")   Wt 114.7 kg (252 lb 12.8 oz)   SpO2 98%   BMI 44.08 kg/m     Body mass index is 44.08 kg/m .     Physical Examination: General appearance - alert, well appearing, and in no distress, oriented to person, place, and time and normal appearing weight  Mental status - alert, oriented to person, place, and time, normal mood, behavior, speech, dress, motor activity, and thought processes  Eyes - pupils equal and reactive, extraocular eye movements intact  Ears - bilateral TM's and external ear canals normal  Nose - normal and patent, no erythema, discharge or polyps  Mouth - mucous membranes moist, pharynx normal without lesions  Neck - supple, no significant adenopathy  Lymphatics - no palpable lymphadenopathy, no hepatosplenomegaly  Chest - clear to auscultation, no wheezes, rales or rhonchi, symmetric air entry  Heart - normal rate, regular rhythm, normal S1, S2, no murmurs, rubs, clicks or gallops  Abdomen - soft, nontender, nondistended, no masses or organomegaly  Breasts - breasts appear normal, no suspicious masses, no " skin or nipple changes or axillary nodes  Neurological - alert, oriented, normal speech, no focal findings or movement disorder noted  Musculoskeletal - no joint tenderness, deformity or swelling  Extremities - peripheral pulses normal, no pedal edema, no clubbing or cyanosis  Skin - normal coloration and turgor, no rashes, no suspicious skin lesions noted    Pelvic - Normal external female genitalia.  Vaginal and cervical mucosa within normal limits on speculum exam.  Surepap obtained.       No visits with results within 14 Day(s) from this visit.   Latest known visit with results is:   Ambulatory - [facility] on 07/02/2021   Component Date Value Ref Range Status     Case Report 07/02/2021    Final                    Value:Surgical Pathology Report                         Case: [id number]                                    Authorizing Provider:  Elin Miramontes MD       Collected:           07/02/2021 [id number]              Ordering Location:     [facility]   Received:            07/02/2021 [address] [place] [address]                                                                      Pathologist:           Kris Mcmullen MD                                                      Specimen:    Endocervical Curettings                                                                     Final Diagnosis 07/02/2021    Final                    Value:ENDOCERVICAL CURETTINGS:       -  SQUAMOUS METAPLASIA WITH ATYPIA AND KOILOCYTOSIS, SUSPICIOUS BUT NOT DIAGNOSTIC FOR          HPV CYTOPATHIC EFFECT      -  NEGATIVE FOR HIGH GRADE EPITHELIAL DYSPLASIA  Electronically signed by Kris Mcmullen MD on 7/7/2021 at 12:35 PM       Comment 07/02/2021    Final                    Value:HISTOLOGY-CYTOLOGY CORRELATION:   Pap smear [id number] was cytologically negative, but the Pap smear material was positive for high-risk HPV DNA by PCR. This correlates with the atypical findings in the endocervical  "curettings.       Microscopic Description 07/02/2021 Microscopic examination performed, substantiating the above diagnosis.   Final     Clinical Information 07/02/2021    Final                    Value:Clinical history: Abnormal pap smear, HPV pos  Reason for procedure: Abnormal pap smear, HPV pos       Gross Description 07/02/2021 The specimen is received in formalin, labeled with the patient's name and designated \"endocervical curettings,\" and consists of a less than 1-gram aggregate of floccular and translucent particles of light tan tissue. The specimen is filtered, and all tissue is submitted. [facility]   Final     Charges 07/02/2021    Final                    Value:CPT: 27756  ICD-10: R87.810       Result Flag 07/02/2021    Final    Comment: SPECIMEN PROCESSING:    All histology slide preparation and stains; and cytology slide preparation, staining, and cytotechnologist screening done at [facility] are performed at [facility], [address] [place] [address], [id number], with final interpretation, frozen section analysis, and cytology adequacy assessment at indicated laboratory.         hCG Urine Qualitative 07/02/2021 Negative  Negative Final       "

## 2022-05-19 LAB
HUMAN PAPILLOMA VIRUS 16 DNA: NEGATIVE
HUMAN PAPILLOMA VIRUS 18 DNA: NEGATIVE
HUMAN PAPILLOMA VIRUS FINAL DIAGNOSIS: ABNORMAL
HUMAN PAPILLOMA VIRUS OTHER HR: POSITIVE

## 2022-05-25 ENCOUNTER — PATIENT OUTREACH (OUTPATIENT)
Dept: FAMILY MEDICINE | Facility: CLINIC | Age: 37
End: 2022-05-25
Payer: COMMERCIAL

## 2022-05-25 LAB
BKR LAB AP GYN ADEQUACY: NORMAL
BKR LAB AP GYN INTERPRETATION: NORMAL
BKR LAB AP HPV REFLEX: NORMAL
BKR LAB AP PREVIOUS ABNL DX: NORMAL
BKR LAB AP PREVIOUS ABNORMAL: NORMAL
PATH REPORT.COMMENTS IMP SPEC: NORMAL
PATH REPORT.COMMENTS IMP SPEC: NORMAL
PATH REPORT.RELEVANT HX SPEC: NORMAL

## 2022-11-20 ENCOUNTER — HEALTH MAINTENANCE LETTER (OUTPATIENT)
Age: 37
End: 2022-11-20

## 2023-04-25 ENCOUNTER — PATIENT OUTREACH (OUTPATIENT)
Dept: FAMILY MEDICINE | Facility: CLINIC | Age: 38
End: 2023-04-25
Payer: COMMERCIAL

## 2023-04-25 PROBLEM — R87.810 CERVICAL HIGH RISK HPV (HUMAN PAPILLOMAVIRUS) TEST POSITIVE: Status: ACTIVE | Noted: 2021-06-14

## 2023-05-18 ENCOUNTER — OFFICE VISIT (OUTPATIENT)
Dept: FAMILY MEDICINE | Facility: CLINIC | Age: 38
End: 2023-05-18
Payer: COMMERCIAL

## 2023-05-18 VITALS
SYSTOLIC BLOOD PRESSURE: 132 MMHG | OXYGEN SATURATION: 99 % | RESPIRATION RATE: 18 BRPM | BODY MASS INDEX: 43.87 KG/M2 | TEMPERATURE: 97.9 F | HEIGHT: 64 IN | WEIGHT: 257 LBS | HEART RATE: 90 BPM | DIASTOLIC BLOOD PRESSURE: 78 MMHG

## 2023-05-18 DIAGNOSIS — Z12.4 CERVICAL CANCER SCREENING: ICD-10-CM

## 2023-05-18 DIAGNOSIS — G43.909 MIGRAINE WITHOUT STATUS MIGRAINOSUS, NOT INTRACTABLE, UNSPECIFIED MIGRAINE TYPE: ICD-10-CM

## 2023-05-18 DIAGNOSIS — R73.01 IMPAIRED FASTING GLUCOSE: ICD-10-CM

## 2023-05-18 DIAGNOSIS — E66.01 MORBID OBESITY (H): ICD-10-CM

## 2023-05-18 DIAGNOSIS — Z85.820 HISTORY OF MELANOMA: ICD-10-CM

## 2023-05-18 DIAGNOSIS — R87.810 CERVICAL HIGH RISK HPV (HUMAN PAPILLOMAVIRUS) TEST POSITIVE: ICD-10-CM

## 2023-05-18 DIAGNOSIS — Z00.00 ROUTINE PHYSICAL EXAMINATION: Primary | ICD-10-CM

## 2023-05-18 DIAGNOSIS — R00.2 PALPITATIONS: ICD-10-CM

## 2023-05-18 LAB
ERYTHROCYTE [DISTWIDTH] IN BLOOD BY AUTOMATED COUNT: 11.9 % (ref 10–15)
HBA1C MFR BLD: 6.3 % (ref 0–5.6)
HCT VFR BLD AUTO: 43.1 % (ref 35–47)
HGB BLD-MCNC: 14.6 G/DL (ref 11.7–15.7)
MCH RBC QN AUTO: 29.2 PG (ref 26.5–33)
MCHC RBC AUTO-ENTMCNC: 33.9 G/DL (ref 31.5–36.5)
MCV RBC AUTO: 86 FL (ref 78–100)
PLATELET # BLD AUTO: 162 10E3/UL (ref 150–450)
RBC # BLD AUTO: 5 10E6/UL (ref 3.8–5.2)
WBC # BLD AUTO: 7 10E3/UL (ref 4–11)

## 2023-05-18 PROCEDURE — 84443 ASSAY THYROID STIM HORMONE: CPT | Performed by: FAMILY MEDICINE

## 2023-05-18 PROCEDURE — 83036 HEMOGLOBIN GLYCOSYLATED A1C: CPT | Performed by: FAMILY MEDICINE

## 2023-05-18 PROCEDURE — 36415 COLL VENOUS BLD VENIPUNCTURE: CPT | Performed by: FAMILY MEDICINE

## 2023-05-18 PROCEDURE — 80053 COMPREHEN METABOLIC PANEL: CPT | Performed by: FAMILY MEDICINE

## 2023-05-18 PROCEDURE — 99213 OFFICE O/P EST LOW 20 MIN: CPT | Mod: 25 | Performed by: FAMILY MEDICINE

## 2023-05-18 PROCEDURE — 80061 LIPID PANEL: CPT | Performed by: FAMILY MEDICINE

## 2023-05-18 PROCEDURE — 93005 ELECTROCARDIOGRAM TRACING: CPT | Performed by: FAMILY MEDICINE

## 2023-05-18 PROCEDURE — 87624 HPV HI-RISK TYP POOLED RSLT: CPT | Performed by: FAMILY MEDICINE

## 2023-05-18 PROCEDURE — 93010 ELECTROCARDIOGRAM REPORT: CPT | Performed by: INTERNAL MEDICINE

## 2023-05-18 PROCEDURE — G0145 SCR C/V CYTO,THINLAYER,RESCR: HCPCS | Performed by: FAMILY MEDICINE

## 2023-05-18 PROCEDURE — 83735 ASSAY OF MAGNESIUM: CPT | Performed by: FAMILY MEDICINE

## 2023-05-18 PROCEDURE — 85027 COMPLETE CBC AUTOMATED: CPT | Performed by: FAMILY MEDICINE

## 2023-05-18 PROCEDURE — 99395 PREV VISIT EST AGE 18-39: CPT | Performed by: FAMILY MEDICINE

## 2023-05-18 RX ORDER — MULTIVITAMIN WITH IRON
1 TABLET ORAL DAILY
COMMUNITY

## 2023-05-18 ASSESSMENT — ENCOUNTER SYMPTOMS
MYALGIAS: 0
PALPITATIONS: 1
ABDOMINAL PAIN: 0
EYE PAIN: 0
DYSURIA: 0
SHORTNESS OF BREATH: 0
CONSTIPATION: 0
NAUSEA: 0
WEAKNESS: 0
COUGH: 1
HEMATURIA: 0
HEMATOCHEZIA: 0
DIZZINESS: 0
FEVER: 0
HEADACHES: 0
NERVOUS/ANXIOUS: 0
BREAST MASS: 0
JOINT SWELLING: 0
DIARRHEA: 0
FREQUENCY: 0
SORE THROAT: 0
HEARTBURN: 0
CHILLS: 0
PARESTHESIAS: 0
ARTHRALGIAS: 0

## 2023-05-18 ASSESSMENT — PAIN SCALES - GENERAL: PAINLEVEL: NO PAIN (0)

## 2023-05-18 NOTE — PATIENT INSTRUCTIONS
Begin taking a prenatal vitamin once daily.      Preventive Health Recommendations  Female Ages 26 - 39  Yearly exam:   See your health care provider every year in order to  Review health changes.   Discuss preventive care.    Review your medicines if you your doctor has prescribed any.    Until age 30: Get a Pap test every three years (more often if you have had an abnormal result).    After age 30: Talk to your doctor about whether you should have a Pap test every 3 years or have a Pap test with HPV screening every 5 years.   You do not need a Pap test if your uterus was removed (hysterectomy) and you have not had cancer.  You should be tested each year for STDs (sexually transmitted diseases), if you're at risk.   Talk to your provider about how often to have your cholesterol checked.  If you are at risk for diabetes, you should have a diabetes test (fasting glucose).  Shots: Get a flu shot each year. Get a tetanus shot every 10 years.   Nutrition:   Eat at least 5 servings of fruits and vegetables each day.  Eat whole-grain bread, whole-wheat pasta and brown rice instead of white grains and rice.  Get adequate Calcium and Vitamin D.     Lifestyle  Exercise at least 150 minutes a week (30 minutes a day, 5 days of the week). This will help you control your weight and prevent disease.  Limit alcohol to one drink per day.  No smoking.   Wear sunscreen to prevent skin cancer.  See your dentist every six months for an exam and cleaning.

## 2023-05-19 LAB
ALBUMIN SERPL BCG-MCNC: 4.2 G/DL (ref 3.5–5.2)
ALP SERPL-CCNC: 100 U/L (ref 35–104)
ALT SERPL W P-5'-P-CCNC: 71 U/L (ref 10–35)
ANION GAP SERPL CALCULATED.3IONS-SCNC: 13 MMOL/L (ref 7–15)
AST SERPL W P-5'-P-CCNC: 69 U/L (ref 10–35)
ATRIAL RATE - MUSE: 85 BPM
BILIRUB SERPL-MCNC: 0.3 MG/DL
BUN SERPL-MCNC: 12.7 MG/DL (ref 6–20)
CALCIUM SERPL-MCNC: 9.5 MG/DL (ref 8.6–10)
CHLORIDE SERPL-SCNC: 103 MMOL/L (ref 98–107)
CHOLEST SERPL-MCNC: 180 MG/DL
CREAT SERPL-MCNC: 0.61 MG/DL (ref 0.51–0.95)
DEPRECATED HCO3 PLAS-SCNC: 24 MMOL/L (ref 22–29)
DIASTOLIC BLOOD PRESSURE - MUSE: NORMAL MMHG
GFR SERPL CREATININE-BSD FRML MDRD: >90 ML/MIN/1.73M2
GLUCOSE SERPL-MCNC: 83 MG/DL (ref 70–99)
HDLC SERPL-MCNC: 35 MG/DL
INTERPRETATION ECG - MUSE: NORMAL
LDLC SERPL CALC-MCNC: 104 MG/DL
MAGNESIUM SERPL-MCNC: 2.1 MG/DL (ref 1.7–2.3)
NONHDLC SERPL-MCNC: 145 MG/DL
P AXIS - MUSE: 39 DEGREES
POTASSIUM SERPL-SCNC: 5 MMOL/L (ref 3.4–5.3)
PR INTERVAL - MUSE: 154 MS
PROT SERPL-MCNC: 7.5 G/DL (ref 6.4–8.3)
QRS DURATION - MUSE: 92 MS
QT - MUSE: 380 MS
QTC - MUSE: 452 MS
R AXIS - MUSE: 38 DEGREES
SODIUM SERPL-SCNC: 140 MMOL/L (ref 136–145)
SYSTOLIC BLOOD PRESSURE - MUSE: NORMAL MMHG
T AXIS - MUSE: 22 DEGREES
TRIGL SERPL-MCNC: 205 MG/DL
TSH SERPL DL<=0.005 MIU/L-ACNC: 3.61 UIU/ML (ref 0.3–4.2)
VENTRICULAR RATE- MUSE: 85 BPM

## 2023-05-19 NOTE — RESULT ENCOUNTER NOTE
Your liver enzymes are mildly elevated.  Looks like I have been intermittently in the past as well.  This is a nonspecific finding.  Be sure to let me know if you are having any abdominal pain, heartburn, or change in appetite.  Since you have seen this in the past, I think we should evaluate this further.  I would like to do an ultrasound to look at your liver appearance and ensure were not missing any underlying condition.  I will have our radiology office call you to schedule this.  In the meantime, avoid alcohol, which can cause liver inflammation.  In addition, your A1c, which is your average blood sugar over the last 3 months, is mildly elevated into the prediabetes range.  This means that your body is beginning to have difficulty in managing sugar, but you do not yet have diabetes.  Regular exercise, healthy eating (lots of freshveggies and fruits, more lean meats and protein, and fewer sweets, baked goods, and bread products), and maintenance of a healthy weight can improve your blood sugar and reduce your risk of developing diabetes in the future.  This makes me suspicious that perhaps your abnormal liver test are due to excess depositing in the liver, called steatosis.  The ultrasound will help us determine this.

## 2023-05-19 NOTE — PROGRESS NOTES
Assessment/Plan:     Routine physical examination  Encouraged healthy lifestyle habits including regular exercise, healthy eating habits, and adequate calcium and vitamin D intake.  Comfortable with pregnancy if it were to occur, declines contraception.  We discussed initiation of prenatal vitamin.  Will await screening Pap smear results.  Information offered regarding advance healthcare directives, she declines today.  Advised COVID vaccination, consideration of hepatitis B vaccine series, she declines.    Cervical high risk HPV (human papillomavirus) test positive  Cervical cancer screening  We will await Pap smear and HPV results.  - Pap Screen with HPV - recommended age 30 - 65 years  - HPV Hold (Lab Only)    Impaired fasting glucose  Encourage efforts at healthy lifestyle habits.  We will check fasting glucose, A1c, and lipids today.  - Lipid panel reflex to direct LDL Non-fasting; Future  - Hemoglobin A1c; Future  - Lipid panel reflex to direct LDL Non-fasting  - Hemoglobin A1c    Morbid obesity (H)  Encourage efforts at healthy lifestyle habits.  We will screen for diabetes, dyslipidemia, and hepatic abnormalities.- Comprehensive metabolic panel; Future  - Comprehensive metabolic panel    History of melanoma  Continue measures to prevent sunburn, continue regular visits with dermatology.    Migraine without status migrainosus, not intractable, unspecified migraine type  Stable, nares difficulties.  Continue over-the-counter medications as needed.    Palpitations  EKG today unremarkable.  Reassurance provided as pattern is most suggestive of benign PVCs.  If additional symptoms, worsening, or other concern, let us know.  We will check thyroid function, electrolytes, and CBC to assess for underlying injury factors.  - TSH with free T4 reflex; Future  - Magnesium; Future  - CBC with platelets; Future  - Comprehensive metabolic panel; Future  - EKG 12-lead, tracing only  - TSH with free T4 reflex  -  Magnesium  - CBC with platelets  - Comprehensive metabolic panel       Patient has been advised of split billing requirements and indicates understanding: Yes    Patient Instructions   Begin taking a prenatal vitamin once daily.      Preventive Health Recommendations  Female Ages 26 - 39  Yearly exam:   See your health care provider every year in order to    Review health changes.     Discuss preventive care.      Review your medicines if you your doctor has prescribed any.    Until age 30: Get a Pap test every three years (more often if you have had an abnormal result).    After age 30: Talk to your doctor about whether you should have a Pap test every 3 years or have a Pap test with HPV screening every 5 years.   You do not need a Pap test if your uterus was removed (hysterectomy) and you have not had cancer.  You should be tested each year for STDs (sexually transmitted diseases), if you're at risk.   Talk to your provider about how often to have your cholesterol checked.  If you are at risk for diabetes, you should have a diabetes test (fasting glucose).  Shots: Get a flu shot each year. Get a tetanus shot every 10 years.   Nutrition:     Eat at least 5 servings of fruits and vegetables each day.    Eat whole-grain bread, whole-wheat pasta and brown rice instead of white grains and rice.    Get adequate Calcium and Vitamin D.     Lifestyle    Exercise at least 150 minutes a week (30 minutes a day, 5 days of the week). This will help you control your weight and prevent disease.    Limit alcohol to one drink per day.    No smoking.     Wear sunscreen to prevent skin cancer.    See your dentist every six months for an exam and cleaning.         No follow-ups on file.           Subjective:     Mei Tello is a 38 year old female who presents for an annual exam.     Here for routine preventive care visit.  Previous Pap smears positive for HPV, due for follow-up cotest today.  Menses regular.  She is now  , well not actively pursuing pregnancy she is also not interested in preventing it and therefore declines contraception.  Not currently taking a prenatal vitamin.  Previous history of melanoma, followed by dermatology regularly.  History of migraine, no recent issues, respond well to over-the-counter medications and NSAIDs.  Exercising regularly and feeling well this.  Eating healthy diet.    About 3-4 times per week she is having heartbeat that is usually a single beat that feels like a jump from her chest into her throat.  They always occur at rest.  No associated lightheadedness or dizziness.  No identified triggers.  No change in exercise tolerance.    Healthy Habits:    Getting at least 3 servings of Calcium per day:  Yes    Bi-annual eye exam:  NO    Dental care twice a year:  Yes    Sleep apnea or symptoms of sleep apnea:  None    Diet:  Regular (no restrictions)    Frequency of exercise:  4-5 days/week    Duration of exercise:  30-45 minutes    Taking medications regularly:  Not Applicable    Medication side effects:  Not applicable    PHQ-2 Total Score:    Additional concerns today:  No      Healthy Habits:   Healthy Diet: Yes  Regular Exercise: Yes  Sunscreen Use: Yes  Dental Visits Regularly: Yes  Seat Belt: Yes  Domestic abuse:   No    Health Maintenance reviewed:  Lipid Profile: Due today  Glucose Screen: Due today  Colonoscopy: N/A  Mammogram: N/A    Gynecologic History  Patient's last menstrual period was 05/13/2023 (exact date).  Contraception: None -- is she planning pregnancy?  Yes  Last Pap: 5/16/2022. Results were: Normal, HPV positive        Immunization History   Administered Date(s) Administered     Flu, Unspecified 10/05/2017     HPV Quadrivalent 04/04/2007, 06/06/2007, 10/12/2007     Influenza Vaccine >6 months (Alfuria,Fluzone) 10/17/2019, 10/02/2020, 10/02/2021, 11/05/2022     TDAP (Adacel,Boostrix) 12/30/2009, 05/19/2021     Immunization status: Eligible for COVID vaccination,  hepatitis B vaccination    Current Outpatient Medications   Medication Sig Dispense Refill     magnesium 250 MG tablet Take 1 tablet by mouth daily       Prenatal Vit-Fe Fumarate-FA (PRENATAL MULTIVITAMIN  PLUS IRON) 27-1 MG TABS Take by mouth daily       Past Medical History:   Diagnosis Date     Cervical high risk HPV (human papillomavirus) test positive 4/4/2018 2009, 2011, 2014 NIL paps 4/4/18 NIL pap, +HR HPV (not 16/18) 5/30/19 NIL pap, +HR HPV (not 16/18) 6/14/21 NIL pap, +HR HPV (not 16/18). Plan: colposcopy due before 9/14/21 / await provider     Migraine headache      Pneumonia due to 2019 novel coronavirus 11/11/2020     Past Surgical History:   Procedure Laterality Date     CHOLECYSTECTOMY       ZZC LAP,CHOLECYSTECTOMY/EXPLORE      Description: Cholecystectomy Laparoscopic;  Recorded: 10/30/2008;     Sulfa antibiotics  Family History   Problem Relation Age of Onset     Hypertension Mother      Heart Disease Father      Bipolar Disorder Sister      Bipolar Disorder Sister      Breast Cancer Maternal Aunt 58.00     Social History     Socioeconomic History     Marital status:      Spouse name: Not on file     Number of children: Not on file     Years of education: Not on file     Highest education level: Not on file   Occupational History     Not on file   Tobacco Use     Smoking status: Never     Smokeless tobacco: Never   Vaping Use     Vaping status: Never Used   Substance and Sexual Activity     Alcohol use: Not on file     Drug use: Not on file     Sexual activity: Not on file   Other Topics Concern     Parent/sibling w/ CABG, MI or angioplasty before 65F 55M? Not Asked   Social History Narrative     Not on file     Social Determinants of Health     Financial Resource Strain: Not on file   Food Insecurity: Not on file   Transportation Needs: Not on file   Physical Activity: Not on file   Stress: Not on file   Social Connections: Not on file   Intimate Partner Violence: Not on file   Housing  "Stability: Not on file       Review of Systems  General:  Denies problem  Eyes: Denies problem  Ears/Nose/Throat: Denies problem  Cardiovascular: Denies problem  Respiratory:  Denies problem  Gastrointestinal:  Denies problem   Genitourinary: Denies problem  Musculoskeletal:  Denies problem       Objective:     /78   Pulse 90   Temp 97.9  F (36.6  C) (Oral)   Resp 18   Ht 1.613 m (5' 3.5\")   Wt 116.6 kg (257 lb)   LMP 05/13/2023 (Exact Date)   SpO2 99%   BMI 44.81 kg/m     Body mass index is 44.81 kg/m .     Physical Examination: General appearance - alert, well appearing, and in no distress, oriented to person, place, and time and normal appearing weight  Mental status - alert, oriented to person, place, and time, normal mood, behavior, speech, dress, motor activity, and thought processes  Eyes - pupils equal and reactive, extraocular eye movements intact  Ears - bilateral TM's and external ear canals normal  Nose - normal and patent, no erythema, discharge or polyps  Mouth - mucous membranes moist, pharynx normal without lesions  Neck - supple, no significant adenopathy  Lymphatics - no palpable lymphadenopathy, no hepatosplenomegaly  Chest - clear to auscultation, no wheezes, rales or rhonchi, symmetric air entry  Heart - normal rate, regular rhythm, normal S1, S2, no murmurs, rubs, clicks or gallops  Abdomen - soft, nontender, nondistended, no masses or organomegaly  Breasts - breasts appear normal, no suspicious masses, no skin or nipple changes or axillary nodes  Neurological - alert, oriented, normal speech, no focal findings or movement disorder noted  Musculoskeletal - no joint tenderness, deformity or swelling  Extremities - peripheral pulses normal, no pedal edema, no clubbing or cyanosis  Skin - normal coloration and turgor, no rashes, no suspicious skin lesions noted   Pelvic - Normal external female genitalia.  Vaginal and cervical mucosa within normal limits on speculum exam.  Surepap " obtained.       Office Visit on 05/18/2023   Component Date Value Ref Range Status     Ventricular Rate 05/18/2023 85  BPM Final     Atrial Rate 05/18/2023 85  BPM Final     OR Interval 05/18/2023 154  ms Final     QRS Duration 05/18/2023 92  ms Final     QT 05/18/2023 380  ms Final     QTc 05/18/2023 452  ms Final     P Axis 05/18/2023 39  degrees Final     R AXIS 05/18/2023 38  degrees Final     T Axis 05/18/2023 22  degrees Final     Interpretation ECG 05/18/2023    Final                    Value:Sinus rhythm  Normal ECG  When compared with ECG of 25-MAR-2012 22:55,  No significant change was found  Confirmed by CHEPE BENNETT MD LOC:[facility] ([id number]) on 5/19/2023 8:26:21 AM       TSH 05/18/2023 3.61  0.30 - 4.20 uIU/mL Final     Magnesium 05/18/2023 2.1  1.7 - 2.3 mg/dL Final     WBC Count 05/18/2023 7.0  4.0 - 11.0 10e3/uL Final     RBC Count 05/18/2023 5.00  3.80 - 5.20 10e6/uL Final     Hemoglobin 05/18/2023 14.6  11.7 - 15.7 g/dL Final     Hematocrit 05/18/2023 43.1  35.0 - 47.0 % Final     MCV 05/18/2023 86  78 - 100 fL Final     MCH 05/18/2023 29.2  26.5 - 33.0 pg Final     MCHC 05/18/2023 33.9  31.5 - 36.5 g/dL Final     RDW 05/18/2023 11.9  10.0 - 15.0 % Final     Platelet Count 05/18/2023 162  150 - 450 10e3/uL Final     Cholesterol 05/18/2023 180  <200 mg/dL Final     Triglycerides 05/18/2023 205 (H)  <150 mg/dL Final     Direct Measure HDL 05/18/2023 35 (L)  >=50 mg/dL Final     LDL Cholesterol Calculated 05/18/2023 104 (H)  <=100 mg/dL Final     Non HDL Cholesterol 05/18/2023 145 (H)  <130 mg/dL Final     Sodium 05/18/2023 140  136 - 145 mmol/L Final     Potassium 05/18/2023 5.0  3.4 - 5.3 mmol/L Final    Specimen slightly hemolyzed, potassium may be falsely elevated.     Chloride 05/18/2023 103  98 - 107 mmol/L Final     Carbon Dioxide (CO2) 05/18/2023 24  22 - 29 mmol/L Final     Anion Gap 05/18/2023 13  7 - 15 mmol/L Final     Urea Nitrogen 05/18/2023 12.7  6.0 - 20.0 mg/dL Final     Creatinine 05/18/2023  0.61  0.51 - 0.95 mg/dL Final     Calcium 05/18/2023 9.5  8.6 - 10.0 mg/dL Final     Glucose 05/18/2023 83  70 - 99 mg/dL Final     Alkaline Phosphatase 05/18/2023 100  35 - 104 U/L Final     AST 05/18/2023 69 (H)  10 - 35 U/L Final    Specimen is hemolyzed which can falsely elevate AST. Analysis of a non-hemolyzed specimen may result in a lower value.  Specimen is hemolyzed which can falsely elevate AST. Analysis of a non-hemolyzed specimen may result in a lower value.     ALT 05/18/2023 71 (H)  10 - 35 U/L Final     Protein Total 05/18/2023 7.5  6.4 - 8.3 g/dL Final     Albumin 05/18/2023 4.2  3.5 - 5.2 g/dL Final     Bilirubin Total 05/18/2023 0.3  <=1.2 mg/dL Final     GFR Estimate 05/18/2023 >90  >60 mL/min/1.73m2 Final    eGFR calculated using 2021 CKD-EPI equation.     Hemoglobin A1C 05/18/2023 6.3 (H)  0.0 - 5.6 % Final    Normal <5.7%   Prediabetes 5.7-6.4%    Diabetes 6.5% or higher     Note: Adopted from ADA consensus guidelines.       Answers for HPI/ROS submitted by the patient on 5/18/2023  abdominal pain: No  Blood in stool: No  Blood in urine: No  chest pain: No  chills: No  congestion: Yes  constipation: No  cough: Yes  diarrhea: No  dizziness: No  ear pain: No  eye pain: No  nervous/anxious: No  fever: No  frequency: No  genital sores: No  headaches: No  hearing loss: No  heartburn: No  arthralgias: No  joint swelling: No  peripheral edema: No  mood changes: No  myalgias: No  nausea: No  dysuria: No  palpitations: Yes  Skin sensation changes: No  sore throat: No  urgency: No  rash: No  shortness of breath: No  visual disturbance: No  weakness: No  pelvic pain: No  vaginal bleeding: No  vaginal discharge: No  tenderness: No  breast mass: No  breast discharge: No

## 2023-05-22 ENCOUNTER — TELEPHONE (OUTPATIENT)
Dept: FAMILY MEDICINE | Facility: CLINIC | Age: 38
End: 2023-05-22
Payer: COMMERCIAL

## 2023-05-22 DIAGNOSIS — R79.89 ELEVATED LFTS: Primary | ICD-10-CM

## 2023-05-22 NOTE — TELEPHONE ENCOUNTER
Pt is calling to get orders placed for the ultrasound of her liver due to lab results from most recent visit. Please place orders.

## 2023-05-23 LAB
BKR LAB AP GYN ADEQUACY: NORMAL
BKR LAB AP GYN INTERPRETATION: NORMAL
BKR LAB AP HPV REFLEX: NORMAL
BKR LAB AP LMP: NORMAL
BKR LAB AP PREVIOUS ABNL DX: NORMAL
BKR LAB AP PREVIOUS ABNORMAL: NORMAL
PATH REPORT.COMMENTS IMP SPEC: NORMAL
PATH REPORT.COMMENTS IMP SPEC: NORMAL
PATH REPORT.RELEVANT HX SPEC: NORMAL

## 2023-05-24 LAB
HUMAN PAPILLOMA VIRUS 16 DNA: NEGATIVE
HUMAN PAPILLOMA VIRUS 18 DNA: NEGATIVE
HUMAN PAPILLOMA VIRUS FINAL DIAGNOSIS: NORMAL
HUMAN PAPILLOMA VIRUS OTHER HR: NEGATIVE

## 2023-05-27 ENCOUNTER — HOSPITAL ENCOUNTER (OUTPATIENT)
Dept: ULTRASOUND IMAGING | Facility: CLINIC | Age: 38
Discharge: HOME | End: 2023-05-27
Attending: FAMILY MEDICINE | Admitting: FAMILY MEDICINE
Payer: COMMERCIAL

## 2023-05-27 DIAGNOSIS — R79.89 ELEVATED LFTS: ICD-10-CM

## 2023-05-27 PROCEDURE — 76705 ECHO EXAM OF ABDOMEN: CPT

## 2023-05-31 DIAGNOSIS — K76.0 HEPATIC STEATOSIS: Primary | ICD-10-CM

## 2023-05-31 NOTE — RESULT ENCOUNTER NOTE
Your ultrasound indicates fat depositing in the liver, called hepatic steatosis.  This is likely the cause of your mild elevation in your liver enzymes.  I recommend avoidance of alcohol which can cause this to worsen.  Work on healthy lifestyle habits including regular exercise and healthy eating.  In addition, we should consider further evaluation for underlying liver conditions that could be attributed to this.  It is nonurgent.

## 2023-06-01 NOTE — RESULT ENCOUNTER NOTE
Message seen by patient on 5/31/23 at 5:34 PM. No phone call required.     AIME Hallman, RN  [facility]

## 2024-02-29 ENCOUNTER — LAB (OUTPATIENT)
Dept: LAB | Facility: CLINIC | Age: 39
End: 2024-02-29
Payer: COMMERCIAL

## 2024-02-29 DIAGNOSIS — K76.0 HEPATIC STEATOSIS: Primary | ICD-10-CM

## 2024-02-29 LAB
FERRITIN SERPL-MCNC: 120 NG/ML (ref 6–175)
IRON BINDING CAPACITY (ROCHE): 370 UG/DL (ref 240–430)
IRON SATN MFR SERPL: 14 % (ref 15–46)
IRON SERPL-MCNC: 52 UG/DL (ref 37–145)

## 2024-02-29 PROCEDURE — 83540 ASSAY OF IRON: CPT

## 2024-02-29 PROCEDURE — 83550 IRON BINDING TEST: CPT

## 2024-02-29 PROCEDURE — 86038 ANTINUCLEAR ANTIBODIES: CPT

## 2024-02-29 PROCEDURE — 99000 SPECIMEN HANDLING OFFICE-LAB: CPT

## 2024-02-29 PROCEDURE — 83516 IMMUNOASSAY NONANTIBODY: CPT | Mod: 90

## 2024-02-29 PROCEDURE — 82784 ASSAY IGA/IGD/IGG/IGM EACH: CPT | Mod: 90

## 2024-02-29 PROCEDURE — 86704 HEP B CORE ANTIBODY TOTAL: CPT

## 2024-02-29 PROCEDURE — 86708 HEPATITIS A ANTIBODY: CPT

## 2024-02-29 PROCEDURE — 87340 HEPATITIS B SURFACE AG IA: CPT

## 2024-02-29 PROCEDURE — 82728 ASSAY OF FERRITIN: CPT

## 2024-02-29 PROCEDURE — 86706 HEP B SURFACE ANTIBODY: CPT

## 2024-02-29 PROCEDURE — 86376 MICROSOMAL ANTIBODY EACH: CPT | Mod: 90

## 2024-02-29 PROCEDURE — 36415 COLL VENOUS BLD VENIPUNCTURE: CPT

## 2024-03-01 LAB
ANA SER QL IF: NEGATIVE
HAV AB SER QL IA: NONREACTIVE
HBV CORE AB SERPL QL IA: NONREACTIVE
HBV SURFACE AB SERPL IA-ACNC: <3.5 M[IU]/ML
HBV SURFACE AB SERPL IA-ACNC: NONREACTIVE M[IU]/ML
HBV SURFACE AG SERPL QL IA: NONREACTIVE

## 2024-03-02 LAB
IGD SER-MCNC: 1.5 MG/DL
SMA IGG SER-ACNC: 7 UNITS

## 2024-03-03 LAB — LKM AB TITR SER IF: NORMAL {TITER}

## 2024-04-22 SDOH — HEALTH STABILITY: PHYSICAL HEALTH: ON AVERAGE, HOW MANY MINUTES DO YOU ENGAGE IN EXERCISE AT THIS LEVEL?: 30 MIN

## 2024-04-22 SDOH — HEALTH STABILITY: PHYSICAL HEALTH: ON AVERAGE, HOW MANY DAYS PER WEEK DO YOU ENGAGE IN MODERATE TO STRENUOUS EXERCISE (LIKE A BRISK WALK)?: 4 DAYS

## 2024-04-22 ASSESSMENT — SOCIAL DETERMINANTS OF HEALTH (SDOH): HOW OFTEN DO YOU GET TOGETHER WITH FRIENDS OR RELATIVES?: ONCE A WEEK

## 2024-04-25 ENCOUNTER — OFFICE VISIT (OUTPATIENT)
Dept: FAMILY MEDICINE | Facility: CLINIC | Age: 39
End: 2024-04-25
Payer: COMMERCIAL

## 2024-04-25 VITALS
DIASTOLIC BLOOD PRESSURE: 77 MMHG | OXYGEN SATURATION: 100 % | BODY MASS INDEX: 44.49 KG/M2 | HEART RATE: 81 BPM | WEIGHT: 260.6 LBS | SYSTOLIC BLOOD PRESSURE: 112 MMHG | RESPIRATION RATE: 20 BRPM | TEMPERATURE: 98.2 F | HEIGHT: 64 IN

## 2024-04-25 DIAGNOSIS — E66.01 CLASS 3 SEVERE OBESITY WITH SERIOUS COMORBIDITY AND BODY MASS INDEX (BMI) OF 40.0 TO 44.9 IN ADULT, UNSPECIFIED OBESITY TYPE (H): ICD-10-CM

## 2024-04-25 DIAGNOSIS — E66.813 CLASS 3 SEVERE OBESITY WITH SERIOUS COMORBIDITY AND BODY MASS INDEX (BMI) OF 40.0 TO 44.9 IN ADULT, UNSPECIFIED OBESITY TYPE (H): ICD-10-CM

## 2024-04-25 DIAGNOSIS — K76.0 METABOLIC DYSFUNCTION-ASSOCIATED STEATOTIC LIVER DISEASE (MASLD): ICD-10-CM

## 2024-04-25 DIAGNOSIS — R73.01 IMPAIRED FASTING GLUCOSE: ICD-10-CM

## 2024-04-25 DIAGNOSIS — Z85.820 HISTORY OF MELANOMA: ICD-10-CM

## 2024-04-25 DIAGNOSIS — N97.9 FEMALE INFERTILITY, PRIMARY: ICD-10-CM

## 2024-04-25 DIAGNOSIS — Z00.00 ROUTINE PHYSICAL EXAMINATION: Primary | ICD-10-CM

## 2024-04-25 LAB
ERYTHROCYTE [DISTWIDTH] IN BLOOD BY AUTOMATED COUNT: 11.9 % (ref 10–15)
HBA1C MFR BLD: 5.5 % (ref 0–5.6)
HCT VFR BLD AUTO: 42.5 % (ref 35–47)
HGB BLD-MCNC: 14.5 G/DL (ref 11.7–15.7)
MCH RBC QN AUTO: 29 PG (ref 26.5–33)
MCHC RBC AUTO-ENTMCNC: 34.1 G/DL (ref 31.5–36.5)
MCV RBC AUTO: 85 FL (ref 78–100)
PLATELET # BLD AUTO: 414 10E3/UL (ref 150–450)
RBC # BLD AUTO: 5 10E6/UL (ref 3.8–5.2)
WBC # BLD AUTO: 13.3 10E3/UL (ref 4–11)

## 2024-04-25 PROCEDURE — 84443 ASSAY THYROID STIM HORMONE: CPT | Performed by: FAMILY MEDICINE

## 2024-04-25 PROCEDURE — 99395 PREV VISIT EST AGE 18-39: CPT | Performed by: FAMILY MEDICINE

## 2024-04-25 PROCEDURE — 36415 COLL VENOUS BLD VENIPUNCTURE: CPT | Performed by: FAMILY MEDICINE

## 2024-04-25 PROCEDURE — 85027 COMPLETE CBC AUTOMATED: CPT | Performed by: FAMILY MEDICINE

## 2024-04-25 PROCEDURE — 83001 ASSAY OF GONADOTROPIN (FSH): CPT | Performed by: FAMILY MEDICINE

## 2024-04-25 PROCEDURE — 84270 ASSAY OF SEX HORMONE GLOBUL: CPT | Performed by: FAMILY MEDICINE

## 2024-04-25 PROCEDURE — 80053 COMPREHEN METABOLIC PANEL: CPT | Performed by: FAMILY MEDICINE

## 2024-04-25 PROCEDURE — 84403 ASSAY OF TOTAL TESTOSTERONE: CPT | Performed by: FAMILY MEDICINE

## 2024-04-25 PROCEDURE — 80061 LIPID PANEL: CPT | Performed by: FAMILY MEDICINE

## 2024-04-25 PROCEDURE — 83036 HEMOGLOBIN GLYCOSYLATED A1C: CPT | Performed by: FAMILY MEDICINE

## 2024-04-25 PROCEDURE — 83002 ASSAY OF GONADOTROPIN (LH): CPT | Performed by: FAMILY MEDICINE

## 2024-04-25 PROCEDURE — 84146 ASSAY OF PROLACTIN: CPT | Performed by: FAMILY MEDICINE

## 2024-04-25 PROCEDURE — 99214 OFFICE O/P EST MOD 30 MIN: CPT | Mod: 25 | Performed by: FAMILY MEDICINE

## 2024-04-25 ASSESSMENT — PAIN SCALES - GENERAL: PAINLEVEL: NO PAIN (0)

## 2024-04-25 NOTE — PATIENT INSTRUCTIONS
I recommend that you receive the hepatitis a and hepatitis B vaccine series.  Check with your insurance coverage.  Preventive Care Advice   This is general advice given by our system to help you stay healthy. However, your care team may have specific advice just for you. Please talk to your care team about your preventive care needs.  Nutrition  Eat 5 or more servings of fruits and vegetables each day.  Try wheat bread, brown rice and whole grain pasta (instead of white bread, rice, and pasta).  Get enough calcium and vitamin D. Check the label on foods and aim for 100% of the RDA (recommended daily allowance).  Lifestyle  Exercise at least 150 minutes each week   (30 minutes a day, 5 days a week).  Do muscle strengthening activities 2 days a week. These help control your weight and prevent disease.  No smoking.  Wear sunscreen to prevent skin cancer.  Have a dental exam and cleaning every 6 months.  Yearly exams  See your health care team every year to talk about:  Any changes in your health.  Any medicines your care team has prescribed.  Preventive care, family planning, and ways to prevent chronic diseases.  Shots (vaccines)   HPV shots (up to age 26), if you've never had them before.  Hepatitis B shots (up to age 59), if you've never had them before.  COVID-19 shot: Get this shot when it's due.  Flu shot: Get a flu shot every year.  Tetanus shot: Get a tetanus shot every 10 years.  Pneumococcal, hepatitis A, and RSV shots: Ask your care team if you need these based on your risk.  Shingles shot (for age 50 and up).  General health tests  Diabetes screening:  Starting at age 35, Get screened for diabetes at least every 3 years.  If you are younger than age 35, ask your care team if you should be screened for diabetes.  Cholesterol test: At age 39, start having a cholesterol test every 5 years, or more often if advised.  Bone density scan (DEXA): At age 50, ask your care team if you should have this scan for  osteoporosis (brittle bones).  Hepatitis C: Get tested at least once in your life.  STIs (sexually transmitted infections)  Before age 24: Ask your care team if you should be screened for STIs.  After age 24: Get screened for STIs if you're at risk. You are at risk for STIs (including HIV) if:  You are sexually active with more than one person.  You don't use condoms every time.  You or a partner was diagnosed with a sexually transmitted infection.  If you are at risk for HIV, ask about PrEP medicine to prevent HIV.  Get tested for HIV at least once in your life, whether you are at risk for HIV or not.  Cancer screening tests  Cervical cancer screening: If you have a cervix, begin getting regular cervical cancer screening tests at age 21. Most people who have regular screenings with normal results can stop after age 65. Talk about this with your provider.  Breast cancer scan (mammogram): If you've ever had breasts, begin having regular mammograms starting at age 40. This is a scan to check for breast cancer.  Colon cancer screening: It is important to start screening for colon cancer at age 45.  Have a colonoscopy test every 10 years (or more often if you're at risk) Or, ask your provider about stool tests like a FIT test every year or Cologuard test every 3 years.  To learn more about your testing options, visit: https://www.[employer]/787730.pdf.  For help making a decision, visit: https://bit.ly/bt75142.  Prostate cancer screening test: If you have a prostate and are age 55 to 69, ask your provider if you would benefit from a yearly prostate cancer screening test.  Lung cancer screening: If you are a current or former smoker age 50 to 80, ask your care team if ongoing lung cancer screenings are right for you.  For informational purposes only. Not to replace the advice of your health care provider. Copyright   2023 [employer]. All rights reserved. Clinically reviewed by the [facility]  Transitions Program. [facility] 504326 - REV 01/24.

## 2024-04-25 NOTE — PROGRESS NOTES
Preventive Care Visit  [facility]  Sade Krueger MD, Family Medicine  Apr 25, 2024      Assessment & Plan     Routine physical examination  Encouraged healthy lifestyle habits including regular exercise, healthy eating habits, and adequate calcium and vitamin D intake.  Advised consideration of hepatitis A vaccine series, hepatitis B vaccine series, seasonal influenza, COVID-vaccine.  Will check fasting lipids and fasting glucose.  She is up-to-date with Pap smear screening, Following every 3 years due to history of positive HPV.  - Lipid panel reflex to direct LDL Fasting; Future  - Lipid panel reflex to direct LDL Fasting    Impaired fasting glucose  Encouraged efforts at healthy lifestyle habits.  Will check fasting glucose and A1c today.  - Hemoglobin A1c; Future  - Comprehensive metabolic panel; Future  - Hemoglobin A1c  - Comprehensive metabolic panel    Class 3 severe obesity with serious comorbidity and body mass index (BMI) of 40.0 to 44.9 in adult, unspecified obesity type (H)  This is complicated by impaired fasting glucose and metabolic dysfunction associated static liver disease.  Encourage efforts at healthy lifestyle habits.  We review programs available, could give consideration to referral to comprehensive weight management.  She will let me know.    History of melanoma  Followed by dermatology, no evidence of active disease.    Metabolic dysfunction-associated steatotic liver disease (MASLD)  We reviewed nature of condition.  We discussed routine monitoring yearly of LFTs, CBC.  We discussed ultrasound every 3 years.  Encourage avoidance of alcohol, efforts at weight loss, advised initiation of hepatitis a and B vaccine series.  - Comprehensive metabolic panel; Future  - CBC with platelets; Future  - Comprehensive metabolic panel  - CBC with platelets    Female infertility, primary  Encouraged her to have her partner tested.  We will check for thyroid dysfunction,  "abnormalities in TSH, FSH, LH, prolactin, and testosterone levels that could be contributing.  If unremarkable, Next step will be to see gynecology.  - TSH with free T4 reflex; Future  - Follicle stimulating hormone; Future  - Luteinizing Hormone; Future  - Prolactin; Future  - Testosterone Free and Total; Future  - TSH with free T4 reflex  - Follicle stimulating hormone  - Luteinizing Hormone  - Prolactin  - Testosterone Free and Total              BMI  Estimated body mass index is 44.93 kg/m  as calculated from the following:    Height as of this encounter: 1.622 m (5' 3.86\").    Weight as of this encounter: 118.2 kg (260 lb 9.6 oz).       Counseling  Appropriate preventive services were discussed with this patient, including applicable screening as appropriate for fall prevention, nutrition, physical activity, Tobacco-use cessation, weight loss and cognition.  Checklist reviewing preventive services available has been given to the patient.  Reviewed patient's diet, addressing concerns and/or questions.           Mercedez Hurley is a 39 year old, presenting for the following:  Physical (Follow up on my liver ultrasound and lab findings, discuss nutrition to help reverse that and to help lower my pre diabetic A1C/)        4/25/2024     7:13 AM   Additional Questions   Roomed by [facility] Directive  Patient does not have a Health Care Directive or Living Will: Discussed advance care planning with patient; information given to patient to review.    Seen today for routine preventive care visit.  She has a history of positive HPV on her Pap, it has been normal recently, redoing Pap every 3 years and she is up-to-date there.  She is having regular menses without any issues, takes a prenatal vitamin.  Has been attempting pregnancy, has intermittently check for ovulation but not regularly, has not detected it.  Interested in further evaluation it has been now about a year and a half.  She has been " diagnosed with hepatic steatosis on ultrasound, had negative evaluation for other underlying causes, due for follow-up of this.  She has eliminated alcohol over the last nearly a year now and is doing well with this.  History of melanoma, followed by dermatology.  Frustrated by her weight.  Most days she is exercising by walking, twice a week as she goes to the gym where she does a class that is strength, cardio, and is feeling well with this.          4/22/2024   General Health   How would you rate your overall physical health? Good   Feel stress (tense, anxious, or unable to sleep) Not at all         4/22/2024   Nutrition   Three or more servings of calcium each day? Yes   Diet: Regular (no restrictions)   How many servings of fruit and vegetables per day? (!) 2-3   How many sweetened beverages each day? 0-1         4/22/2024   Exercise   Days per week of moderate/strenous exercise 4 days   Average minutes spent exercising at this level 30 min         4/22/2024   Social Factors   Frequency of gathering with friends or relatives Once a week   Worry food won't last until get money to buy more No   Food not last or not have enough money for food? No   Do you have housing?  Yes   Are you worried about losing your housing? No   Lack of transportation? No   Unable to get utilities (heat,electricity)? No         4/22/2024   Dental   Dentist two times every year? Yes         4/22/2024   TB Screening   Were you born outside of the [place]? No         Today's PHQ-2 Score:       4/24/2024    10:55 AM   PHQ-2 ( 1999 [employer])   Q1: Little interest or pleasure in doing things 0   Q2: Feeling down, depressed or hopeless 0   PHQ-2 Score 0   Q1: Little interest or pleasure in doing things Not at all   Q2: Feeling down, depressed or hopeless Not at all   PHQ-2 Score 0           4/22/2024   Substance Use   Alcohol more than 3/day or more than 7/wk Not Applicable   Do you use any other substances recreationally? No     Social History      Tobacco Use    Smoking status: Never    Smokeless tobacco: Never   Vaping Use    Vaping status: Never Used           5/18/2023   LAST FHS-7 RESULTS   1st degree relative breast or ovarian cancer Yes   Any relative bilateral breast cancer Unknown   Any male have breast cancer No   Any ONE woman have BOTH breast AND ovarian cancer Yes   Any woman with breast cancer before 50yrs No   2 or more relatives with breast AND/OR ovarian cancer No   2 or more relatives with breast AND/OR bowel cancer Unknown        Mammogram Screening - Patient under 40 years of age: Routine Mammogram Screening not recommended.         4/22/2024   STI Screening   New sexual partner(s) since last STI/HIV test? No     History of abnormal Pap smear: YES - updated in Problem List and Health Maintenance accordingly        Latest Ref Rng & Units 5/18/2023     2:03 PM 5/16/2022     8:47 AM 6/14/2021     2:42 PM   PAP / HPV   PAP  Negative for Intraepithelial Lesion or Malignancy (NILM)  Negative for Intraepithelial Lesion or Malignancy (NILM)  Negative for squamous intraepithelial lesion or malignancy  Electronically signed by Abby Walsh CT (ASCP) on 6/23/2021 at 12:43 PM      HPV 16 DNA Negative Negative  Negative  Negative    HPV 18 DNA Negative Negative  Negative  Negative    Other HR HPV Negative Negative  Positive  Positive            4/22/2024   Contraception/Family Planning   Questions about contraception or family planning No        Reviewed and updated as needed this visit by Provider                    Past Medical History:   Diagnosis Date    Cervical high risk HPV (human papillomavirus) test positive 4/4/2018 2009, 2011, 2014 NIL paps 4/4/18 NIL pap, +HR HPV (not 16/18) 5/30/19 NIL pap, +HR HPV (not 16/18) 6/14/21 NIL pap, +HR HPV (not 16/18). Plan: colposcopy due before 9/14/21 / await provider    Migraine headache     Pneumonia due to 2019 novel coronavirus 11/11/2020     Past Surgical History:   Procedure Laterality  Date    CHOLECYSTECTOMY      ZZC LAP,CHOLECYSTECTOMY/EXPLORE      Description: Cholecystectomy Laparoscopic;  Recorded: 10/30/2008;     OB History   No obstetric history on file.     Lab work is in process  Labs reviewed in EPIC  BP Readings from Last 3 Encounters:   04/25/24 112/77   05/18/23 132/78   05/16/22 134/84    Wt Readings from Last 3 Encounters:   04/25/24 118.2 kg (260 lb 9.6 oz)   05/18/23 116.6 kg (257 lb)   05/16/22 114.7 kg (252 lb 12.8 oz)                  Patient Active Problem List   Diagnosis    Cervical high risk HPV (human papillomavirus) test positive    History of melanoma    Morbid obesity (H)    Impaired fasting glucose     Past Surgical History:   Procedure Laterality Date    CHOLECYSTECTOMY      ZZC LAP,CHOLECYSTECTOMY/EXPLORE      Description: Cholecystectomy Laparoscopic;  Recorded: 10/30/2008;       Social History     Tobacco Use    Smoking status: Never    Smokeless tobacco: Never   Substance Use Topics    Alcohol use: Not on file     Family History   Problem Relation Age of Onset    Hypertension Mother     Heart Disease Father     Bipolar Disorder Sister     Bipolar Disorder Sister     Breast Cancer Maternal Aunt 58.00         Current Outpatient Medications   Medication Sig Dispense Refill    magnesium 250 MG tablet Take 1 tablet by mouth daily      Prenatal Vit-Fe Fumarate-FA (PRENATAL MULTIVITAMIN  PLUS IRON) 27-1 MG TABS Take by mouth daily       Allergies   Allergen Reactions    Sulfa Antibiotics      Recent Labs   Lab Test 04/25/24  0821 05/18/23  1433 05/16/22  0913 06/01/21  0821 02/01/21  1130 12/21/20  1442 11/30/20  1216   A1C 5.5 6.3* 5.6  --   --   --   --    LDL  --  104* 109 51  --   --   --    HDL  --  35* 48* 51  --   --   --    TRIG  --  205* 193* 232*  --   --   --    ALT  --  71*  --  25  --  41 103*   CR  --  0.61  --  0.60  --   --  0.59*   GFRESTIMATED  --  >90  --  >60  --   --  >60   GFRESTBLACK  --   --   --  >60  --   --  >60   POTASSIUM  --  5.0  --  4.5   "--   --  4.3   TSH  --  3.61  --   --  4.76  --   --           Review of Systems  Constitutional, neuro, ENT, endocrine, pulmonary, cardiac, gastrointestinal, genitourinary, musculoskeletal, integument and psychiatric systems are negative, except as otherwise noted.     Objective    Exam  /77 (BP Location: Right arm, Patient Position: Sitting, Cuff Size: Adult Large)   Pulse 81   Temp 98.2  F (36.8  C) (Oral)   Resp 20   Ht 1.622 m (5' 3.86\")   Wt 118.2 kg (260 lb 9.6 oz)   LMP 04/14/2024   SpO2 100%   BMI 44.93 kg/m     Estimated body mass index is 44.93 kg/m  as calculated from the following:    Height as of this encounter: 1.622 m (5' 3.86\").    Weight as of this encounter: 118.2 kg (260 lb 9.6 oz).    Physical Exam  Physical Examination: General appearance - alert, well appearing, and in no distress, oriented to person, place, and time and normal appearing weight  Mental status - alert, oriented to person, place, and time, normal mood, behavior, speech, dress, motor activity, and thought processes  Eyes - pupils equal and reactive, extraocular eye movements intact  Ears - bilateral TM's and external ear canals normal  Nose - normal and patent, no erythema, discharge or polyps  Mouth - mucous membranes moist, pharynx normal without lesions  Neck - supple, no significant adenopathy  Lymphatics - no palpable lymphadenopathy, no hepatosplenomegaly  Chest - clear to auscultation, no wheezes, rales or rhonchi, symmetric air entry  Heart - normal rate, regular rhythm, normal S1, S2, no murmurs, rubs, clicks or gallops  Abdomen - soft, nontender, nondistended, no masses or organomegaly  Breasts - breasts appear normal, no suspicious masses, no skin or nipple changes or axillary nodes  Neurological - alert, oriented, normal speech, no focal findings or movement disorder noted  Musculoskeletal - no joint tenderness, deformity or swelling  Extremities - peripheral pulses normal, no pedal edema, no clubbing or " cyanosis  Skin - normal coloration and turgor, no rashes, no suspicious skin lesions noted          Signed Electronically by: Sade Krueger MD

## 2024-04-26 LAB
ALBUMIN SERPL BCG-MCNC: 4.5 G/DL (ref 3.5–5.2)
ALP SERPL-CCNC: 96 U/L (ref 40–150)
ALT SERPL W P-5'-P-CCNC: 31 U/L (ref 0–50)
ANION GAP SERPL CALCULATED.3IONS-SCNC: 12 MMOL/L (ref 7–15)
AST SERPL W P-5'-P-CCNC: 27 U/L (ref 0–45)
BILIRUB SERPL-MCNC: 0.3 MG/DL
BUN SERPL-MCNC: 11.9 MG/DL (ref 6–20)
CALCIUM SERPL-MCNC: 9.8 MG/DL (ref 8.6–10)
CHLORIDE SERPL-SCNC: 107 MMOL/L (ref 98–107)
CHOLEST SERPL-MCNC: 204 MG/DL
CREAT SERPL-MCNC: 0.62 MG/DL (ref 0.51–0.95)
DEPRECATED HCO3 PLAS-SCNC: 23 MMOL/L (ref 22–29)
EGFRCR SERPLBLD CKD-EPI 2021: >90 ML/MIN/1.73M2
FASTING STATUS PATIENT QL REPORTED: ABNORMAL
FSH SERPL IRP2-ACNC: 14.1 MIU/ML
GLUCOSE SERPL-MCNC: 108 MG/DL (ref 70–99)
HDLC SERPL-MCNC: 49 MG/DL
LDLC SERPL CALC-MCNC: 120 MG/DL
LH SERPL-ACNC: 58.7 MIU/ML
NONHDLC SERPL-MCNC: 155 MG/DL
POTASSIUM SERPL-SCNC: 4.5 MMOL/L (ref 3.4–5.3)
PROLACTIN SERPL 3RD IS-MCNC: 28 NG/ML (ref 5–23)
PROT SERPL-MCNC: 7.4 G/DL (ref 6.4–8.3)
SHBG SERPL-SCNC: 53 NMOL/L (ref 30–135)
SODIUM SERPL-SCNC: 142 MMOL/L (ref 135–145)
TRIGL SERPL-MCNC: 173 MG/DL
TSH SERPL DL<=0.005 MIU/L-ACNC: 3.13 UIU/ML (ref 0.3–4.2)

## 2024-04-30 ENCOUNTER — TELEPHONE (OUTPATIENT)
Dept: FAMILY MEDICINE | Facility: CLINIC | Age: 39
End: 2024-04-30
Payer: COMMERCIAL

## 2024-04-30 LAB
TESTOST FREE SERPL-MCNC: 0.94 NG/DL
TESTOST SERPL-MCNC: 70 NG/DL (ref 8–60)

## 2024-04-30 NOTE — TELEPHONE ENCOUNTER
"See lab result note from the PCP to the patient on 4/30/24:    ----- Message from Sade Krueger MD sent at 4/30/2024  9:13 AM CDT -----  \"Your testosterone level is mildly elevated.  This is a common finding in the setting of polycystic ovarian syndrome, a condition that can cause hormone imbalance, difficulty with ovulation and achieving pregnancy, and is associated with prediabetes.  I suspect this is perhaps the reason you have not yet achieved pregnancy, though the gynecologist would help us to better assess things and determine if this condition is present.  I recommended seeing a gynecologist as we discussed at your visit.  Bring these lab results with you.\"       Written by Sade Krueger MD on 4/30/2024  9:13 AM CDT  Seen by patient Mei Tello on 4/30/2024  9:42 AM    Patient read the above lab result note on 4/30/24.     Writer will close this encounter.    Manjula Hernandez RN, BSN  [facility]    "

## 2024-04-30 NOTE — RESULT ENCOUNTER NOTE
Your testosterone level is mildly elevated.  This is a common finding in the setting of polycystic ovarian syndrome, a condition that can cause hormone imbalance, difficulty with ovulation and achieving pregnancy, and is associated with prediabetes.  I suspect this is perhaps the reason you have not yet achieved pregnancy, though the gynecologist would help us to better assess things and determine if this condition is present.  I recommended seeing a gynecologist as we discussed at your visit.  Bring these lab results with you.

## 2024-05-17 ENCOUNTER — TRANSFERRED RECORDS (OUTPATIENT)
Dept: HEALTH INFORMATION MANAGEMENT | Facility: CLINIC | Age: 39
End: 2024-05-17
Payer: COMMERCIAL

## 2024-06-05 ENCOUNTER — LAB REQUISITION (OUTPATIENT)
Dept: LAB | Facility: CLINIC | Age: 39
End: 2024-06-05
Payer: COMMERCIAL

## 2024-06-05 DIAGNOSIS — Z31.41 ENCOUNTER FOR FERTILITY TESTING: ICD-10-CM

## 2024-06-05 LAB — HBA1C MFR BLD: 5.6 %

## 2024-06-05 PROCEDURE — 82670 ASSAY OF TOTAL ESTRADIOL: CPT | Mod: ORL | Performed by: OBSTETRICS & GYNECOLOGY

## 2024-06-05 PROCEDURE — 84443 ASSAY THYROID STIM HORMONE: CPT | Mod: ORL | Performed by: OBSTETRICS & GYNECOLOGY

## 2024-06-05 PROCEDURE — 83001 ASSAY OF GONADOTROPIN (FSH): CPT | Mod: ORL | Performed by: OBSTETRICS & GYNECOLOGY

## 2024-06-05 PROCEDURE — 83002 ASSAY OF GONADOTROPIN (LH): CPT | Mod: ORL | Performed by: OBSTETRICS & GYNECOLOGY

## 2024-06-05 PROCEDURE — 82166 ASSAY ANTI-MULLERIAN HORM: CPT | Mod: ORL | Performed by: OBSTETRICS & GYNECOLOGY

## 2024-06-05 PROCEDURE — 83036 HEMOGLOBIN GLYCOSYLATED A1C: CPT | Mod: ORL | Performed by: OBSTETRICS & GYNECOLOGY

## 2024-06-06 LAB
ESTRADIOL SERPL-MCNC: 43 PG/ML
FSH SERPL IRP2-ACNC: 6.1 MIU/ML
LH SERPL-ACNC: 4.4 MIU/ML
MIS SERPL-MCNC: 1.53 NG/ML (ref 0.15–7.5)
TSH SERPL DL<=0.005 MIU/L-ACNC: 3.01 UIU/ML (ref 0.3–4.2)

## 2024-06-12 ENCOUNTER — OFFICE VISIT (OUTPATIENT)
Dept: OBGYN | Facility: CLINIC | Age: 39
End: 2024-06-12
Payer: COMMERCIAL

## 2024-06-12 ENCOUNTER — HOSPITAL ENCOUNTER (OUTPATIENT)
Dept: GENERAL RADIOLOGY | Facility: CLINIC | Age: 39
Discharge: HOME | End: 2024-06-12
Attending: NURSE PRACTITIONER | Admitting: NURSE PRACTITIONER
Payer: COMMERCIAL

## 2024-06-12 DIAGNOSIS — Z31.41 FERTILITY TESTING: ICD-10-CM

## 2024-06-12 DIAGNOSIS — Z31.41 ENCOUNTER FOR FERTILITY TESTING: ICD-10-CM

## 2024-06-12 LAB — HCG UR QL: NEGATIVE

## 2024-06-12 PROCEDURE — 58340 CATHETER FOR HYSTEROGRAPHY: CPT | Performed by: OBSTETRICS & GYNECOLOGY

## 2024-06-12 PROCEDURE — 81025 URINE PREGNANCY TEST: CPT | Performed by: OBSTETRICS & GYNECOLOGY

## 2024-06-12 PROCEDURE — 58340 CATHETER FOR HYSTEROGRAPHY: CPT

## 2024-06-12 PROCEDURE — 250N000011 HC RX IP 250 OP 636: Mod: JZ | Performed by: NURSE PRACTITIONER

## 2024-06-12 RX ORDER — IOPAMIDOL 612 MG/ML
15 INJECTION, SOLUTION INTRATHECAL ONCE
Status: COMPLETED | OUTPATIENT
Start: 2024-06-12 | End: 2024-06-12

## 2024-06-12 RX ADMIN — IOPAMIDOL 15 ML: 612 INJECTION, SOLUTION INTRATHECAL at 12:34

## 2024-06-12 RX ADMIN — IOPAMIDOL 15 ML: 612 INJECTION, SOLUTION INTRATHECAL at 12:05

## 2024-06-12 NOTE — PROGRESS NOTES
[facility] OB/GYN     HSG Procedure Note     Mei Tello  1985  [id number]     Procedure: Hysterosalpingogram     Indication: Infertility evaluation      Procedure: I reviewed the risks of the procedure including bleeding, cramping/pain and infection. Written informed consent was signed. The patient was placed in dorsal lithotomy and a speculum was used to visualize the cervix. The cervix was cleaned with betadine swabs x3. The HSG catheter was passed through the cervix and the catheter balloon was inflated within the uterus. Dye was injected through the catheter while real-time x-rays were obtained by the Radiologist. Please see their documentation for interpretation of these images. Once adequate images were obtained, all instruments and catheter were removed. The patient tolerated the procedure well and EBL 0cc.    Cora Castellano DO

## 2024-11-06 ENCOUNTER — TRANSFERRED RECORDS (OUTPATIENT)
Dept: HEALTH INFORMATION MANAGEMENT | Facility: CLINIC | Age: 39
End: 2024-11-06
Payer: COMMERCIAL

## 2024-12-05 ENCOUNTER — LAB REQUISITION (OUTPATIENT)
Dept: LAB | Facility: CLINIC | Age: 39
End: 2024-12-05

## 2024-12-05 DIAGNOSIS — Z32.01 ENCOUNTER FOR PREGNANCY TEST, RESULT POSITIVE: ICD-10-CM

## 2024-12-05 PROCEDURE — 87086 URINE CULTURE/COLONY COUNT: CPT | Performed by: NURSE PRACTITIONER

## 2024-12-07 LAB — BACTERIA UR CULT: NORMAL

## 2024-12-27 ENCOUNTER — TRANSFERRED RECORDS (OUTPATIENT)
Dept: HEALTH INFORMATION MANAGEMENT | Facility: CLINIC | Age: 39
End: 2024-12-27
Payer: COMMERCIAL

## 2025-01-17 ENCOUNTER — MEDICAL CORRESPONDENCE (OUTPATIENT)
Dept: HEALTH INFORMATION MANAGEMENT | Facility: CLINIC | Age: 40
End: 2025-01-17
Payer: COMMERCIAL

## 2025-01-20 ENCOUNTER — PRE VISIT (OUTPATIENT)
Dept: MATERNAL FETAL MEDICINE | Facility: CLINIC | Age: 40
End: 2025-01-20
Payer: COMMERCIAL

## 2025-01-21 ENCOUNTER — TRANSFERRED RECORDS (OUTPATIENT)
Dept: HEALTH INFORMATION MANAGEMENT | Facility: CLINIC | Age: 40
End: 2025-01-21
Payer: COMMERCIAL

## 2025-01-22 ENCOUNTER — OFFICE VISIT (OUTPATIENT)
Dept: MATERNAL FETAL MEDICINE | Facility: CLINIC | Age: 40
End: 2025-01-22
Attending: OBSTETRICS & GYNECOLOGY
Payer: COMMERCIAL

## 2025-01-22 ENCOUNTER — HOSPITAL ENCOUNTER (OUTPATIENT)
Dept: ULTRASOUND IMAGING | Facility: CLINIC | Age: 40
Discharge: HOME | End: 2025-01-22
Attending: OBSTETRICS & GYNECOLOGY
Payer: COMMERCIAL

## 2025-01-22 DIAGNOSIS — O28.5 ABNORMAL GENETIC TEST IN PREGNANCY: ICD-10-CM

## 2025-01-22 DIAGNOSIS — E66.01 SEVERE OBESITY DUE TO EXCESS CALORIES AFFECTING PREGNANCY, ANTEPARTUM (H): Primary | ICD-10-CM

## 2025-01-22 DIAGNOSIS — O26.90 PREGNANCY RELATED CONDITION, ANTEPARTUM: ICD-10-CM

## 2025-01-22 DIAGNOSIS — O09.512 SUPERVISION OF ELDERLY PRIMIGRAVIDA IN SECOND TRIMESTER: ICD-10-CM

## 2025-01-22 DIAGNOSIS — O99.210 SEVERE OBESITY DUE TO EXCESS CALORIES AFFECTING PREGNANCY, ANTEPARTUM (H): Primary | ICD-10-CM

## 2025-01-22 DIAGNOSIS — Z36.3 ENCOUNTER FOR ROUTINE SCREENING FOR FETAL MALFORMATION USING ULTRASOUND: ICD-10-CM

## 2025-01-22 DIAGNOSIS — O28.5 ABNORMAL GENETIC TEST IN PREGNANCY: Primary | ICD-10-CM

## 2025-01-22 DIAGNOSIS — Z31.5 ENCOUNTER FOR PROCREATIVE GENETIC COUNSELING AND TESTING: ICD-10-CM

## 2025-01-22 PROCEDURE — 76805 OB US >/= 14 WKS SNGL FETUS: CPT

## 2025-01-22 PROCEDURE — 96041 GENETIC COUNSELING SVC EA 30: CPT | Performed by: GENETIC COUNSELOR, MS

## 2025-01-22 NOTE — PROGRESS NOTES
[facility]  Genetic Counseling Consult    Patient:  Mei Tello  Preferred Name: Mei YOB: 1985   Date of Service:  25   MRN: [id number]    Mei was seen at the [facility] for genetic consultation. The indication for genetic counseling is  2x failed NIPT due to low fetal fraction . The patient was accompanied to this visit by their mother.    The session was conducted in English.      IMPRESSION/ PLAN   1. Mei had genetic screening earlier in this pregnancy. Their non-invasive prenatal test was a no call result due to low fetal fraction on two separate test attempts Please see details of results below.      2. During today's [facility] visit, Mei had a genetic counseling session only. A third NIPT attempt and diagnostic testing by amniocentesis were discussed and declined for the time being.     3. Mei had a early second trimester anatomy ultrasound today. Please see the ultrasound report for further details.    4. Further recommendations include a fetal anatomy level II ultrasound with [facility]. The upcoming ultrasound has been scheduled for 2025.    PREGNANCY HISTORY   /Parity:       CURRENT PREGNANCY   Current Age: 39 year old   Age at Delivery: 40 year old  FRANCINE: 2025, by Last Menstrual Period                                   Gestational Age: 15w2d  This pregnancy is a single gestation.   This pregnancy was conceived spontaneously.  Mei reports the following complications and/or exposure concerns in this pregnancy: NONE    MEDICAL HISTORY   Mei s reported medical history is not expected to impact pregnancy management or risks to fetal development.       FAMILY HISTORY   A three-generation pedigree was not obtained today due to our focus on other topics.. However family history was reviewed briefly and the following significant findings were reported today:    Mei's mother had a daughter who passed away from complications of trisomy 18.  This pregnancy was found to have multiple concerns on an ultrasound and diagnostic testing via amniocentesis identified that the pregnancy had trisomy 18.  Both Mei and her mother demonstrated a strong understanding of the severity and prognosis associated with the condition.  We discussed that a high majority of cases of T18 are sporadic events occurring at conception, however, there are instances of [id number] derived from a parental translocation, in which one of the parents have a rearrangement in their own chromosomes that predisposes them to having a pregnancy with a chromosome abnormality.  Mei's mother reports that she still has her daughter's chromosome test results, which would clarify if there is any concern for a translocation impacting risks for T18 in Mei's family.  We discussed that genetic counseling is available to review this in more detail if desired.  Most likely this family history should not impact risks or probability for the current pregnancy, however, risk cannot be completely ruled out without more information regarding that child's chromosome status.      Otherwise, the reported family history is unremarkable for multiple miscarriages, stillbirths, birth defects, intellectual disabilities, known genetic conditions, and consanguinity.       RISK ASSESSMENT FOR INHERITED CONDITIONS AND CARRIER SCREENING OPTIONS   Expanded carrier screening is available to screen for autosomal recessive conditions and X-linked conditions in a large list of genes. Carrier screening does not test the pregnancy but gives a risk assessment for the pregnancy and future pregnancies to have the condition. Expanded carrier screening is designed to identify carrier status for conditions that are primarily childhood or adolescent onset. Expanded carrier screening does not evaluate for adult-onset conditions such as  hereditary cancer syndromes, dementia/ Alzheimer's disease, or cardiovascular disease risk factors. Additionally, expanded carrier screening is not comprehensive for all known genetic diseases or inherited conditions. Carrier screening does not test for all genetic and health conditions or risk factors.      Autosomal recessive conditions happen when a mutation has been inherited from the egg and sperm and include conditions like cystic fibrosis, thalassemia, hearing loss, spinal muscular atrophy, and more. We reviewed that when both biological parents carry a harmful genetic change in a gene associated with autosomal recessive inheritance, each of their pregnancies has a 1 in 4 (25%) chance to be affected by that condition. X-linked conditions happen when a mutation has been inherited from the egg and include conditions like fragile X syndrome.With x-linked conditions, the specific risk generally depends on the chromosomal sex of the fetus, with XY individuals (generally male) being most severely affected.     [place] screening was not reviewed due to focus on other topics.  About [place]  Screening    The patient does NOT have a family history of known inherited conditions. This does NOT mean the patient and/or their partner is not a carrier of a condition. Approximately 90% of couples at an increased reproductive risk for an inherited condition have no family history of that condition.     The patient has not had carrier screening previously.     Carrier screening was not discussed today. If the patient is interested in further discussing the option of carrier screening, MFM would be available to assist in coordination if desired.      RISK ASSESSMENT FOR CHROMOSOME CONDITIONS   We explained that the risk for fetal chromosome abnormalities increases with maternal age. We discussed specific features of common chromosome abnormalities, including Down syndrome, trisomy 13, trisomy 18, and sex chromosome  trisomies.    At age 40 at midtrimester, the risk to have a baby with Down syndrome is 1 in 129.   At age 40 at midtrimester, the risk to have a baby with any chromosome abnormality is 1 in 65.     Mei had genetic screening earlier in this pregnancy. Their non-invasive prenatal test was a no call result due to low fetal fraction. Please see details of results below.      Mei's [facility] NIPT returned twice as low fetal fraction indicating higher risk for pregnancy.  We discussed that the nature of cell free DNA derived from the pregnancy and how the NIPT testing labs must be able to see a certain threshold of pregnancy DNA in the blood sample to give a valid result.  We discussed that Mei's tests have not shown sufficient DNA for the lab to screen for chromosome abnormalities, and that there are many possible explanations for this low fetal fraction.  Some factors that can influence fetal fraction include gestational age, maternal BMI, maternal health conditions and medications, and physical factors at the time of sample collection.  However, there are several specific genetic conditions that are associated with low fetal fraction as well, and multiple failed NIPT due to low fetal fraction in the absence of other explanations has been shown to be associated with increased probability for the pregnancy to have trisomy 18, trisomy 13, or triploidy.  The risk for trisomy 21 (Down syndrome) and monosomy X (Roajs syndrome) remain unchanged.  I stressed for Mei that her test results do not directly show evidence of any of these genetic conditions, but rather these chromosome conditions are found at higher frequency in pregnancies that have this failed result.  We discussed that additional testing and surveillance would be appropriate. After discussion the options for repeat NIPT, either with [facility] or with a lab with lower test failure rates, or diagonstic testing by amniocentesis, Mei indicated she  would plan to decline additional NIPT or invasive testing unless additional concerns arise on ultrasound.  Today's scan was unremarkable but limited by early gestational age and Mei will return to [facility] for a comprehensive anatomy scan in ~1 month.     We discussed the following screening options:   REPEAT Non-invasive prenatal testing (NIPT) through [facility]  Also called cell-free DNA screening because it detects chromosomes from the placenta in the pregnant person's blood  Can be done any time after 10 weeks gestation  Screens for trisomy 21, trisomy 18, trisomy 13, monosomy X, and triploidy  Cannot screen for open neural tube defects, maternal serum AFP after 15 weeks can be considered  Reviewed chance of again receiving a low fetal fraction result        MPSS platform Non-invasive prenatal testing (NIPT) through [facility] [facility]  Also called cell-free DNA screening because it detects chromosomes from the placenta in the pregnant person's blood  Can be done any time after 10 weeks gestation  Screens for trisomy 21, trisomy 18, trisomy 13, and sex chromosome aneuploidies. Cannot screen for triploidy.   Cannot screen for open neural tube defects, maternal serum AFP after 15 weeks can be considered        GENETIC TESTING OPTIONS   Genetic testing during a pregnancy includes screening and diagnostic procedures.      Screening tests are non-invasive which means no risk to the pregnancy and includes ultrasounds and blood work. The benefits and limitations of screening were reviewed. Screening tests provide a risk assessment (chance) specific to the pregnancy for certain fetal chromosome abnormalities but cannot definitively diagnose or exclude a fetal chromosome abnormality. Follow-up genetic counseling and consideration of diagnostic testing is recommended with any abnormal screening result. Diagnostic testing during a pregnancy is more certain and can test for more conditions. However, the tests do have a risk of  miscarriage that requires careful consideration. These tests can detect fetal chromosome abnormalities with greater than 99% certainty. Results can be compromised by maternal cell contamination or mosaicism and are limited by the resolution of current genetic testing technology.     There is no screening or diagnostic test that detects all forms of birth defects or intellectual disability.       We discussed the following ultrasound options:    Comprehensive level II ultrasound (Fetal Anatomy Ultrasound)  Ultrasound done between 18-20 weeks gestation  Screens for major birth defects and markers for aneuploidy (like trisomy 21 and trisomy 18)  Includes looking at the fetus/baby's growth, heart, organs (stomach, kidneys), placenta, and amniotic fluid    We discussed the following diagnostic options:     Amniocentesis  Invasive diagnostic procedure done after 15 weeks gestation  The procedure collects a small sample of amniotic fluid for the purpose of chromosomal testing and/or other genetic testing  Diagnostic result; more than 99% sensitivity for fetal chromosome abnormalities  Testing for AFP in the amniotic fluid can test for open neural tube defects    It was a pleasure to be involved with Mei s care. Total time spent on this encounter today was 45 minutes.    Alfredo Benson, ROOSEVELT, MS, [facility]  Board Certified and [place] Licensed Genetic Counselor   [facility]  Maternal Fetal Medicine  Office: [phone]  [facility]: [phone]   Fax: [phone]  [facility]

## 2025-01-22 NOTE — PROGRESS NOTES
Please refer to ultrasound report under 'Imaging' Studies of 'Chart Review' tabs.    Francisco Wray M.D.

## 2025-01-22 NOTE — NURSING NOTE
Education provided to patient on today's ultrasound.  SBAR given to ADRYAN ROJAS, see their note in Epic.

## 2025-01-30 ENCOUNTER — MEDICAL CORRESPONDENCE (OUTPATIENT)
Dept: HEALTH INFORMATION MANAGEMENT | Facility: CLINIC | Age: 40
End: 2025-01-30
Payer: COMMERCIAL

## 2025-01-30 ENCOUNTER — TRANSCRIBE ORDERS (OUTPATIENT)
Dept: OTHER | Age: 40
End: 2025-01-30

## 2025-01-30 DIAGNOSIS — O24.419 GESTATIONAL DIABETES MELLITUS IN PREGNANCY, UNSPECIFIED CONTROL: Primary | ICD-10-CM

## 2025-02-04 ENCOUNTER — TELEPHONE (OUTPATIENT)
Dept: EDUCATION SERVICES | Facility: CLINIC | Age: 40
End: 2025-02-04

## 2025-02-04 ENCOUNTER — VIRTUAL VISIT (OUTPATIENT)
Dept: EDUCATION SERVICES | Facility: CLINIC | Age: 40
End: 2025-02-04
Attending: STUDENT IN AN ORGANIZED HEALTH CARE EDUCATION/TRAINING PROGRAM
Payer: COMMERCIAL

## 2025-02-04 DIAGNOSIS — O24.419 GESTATIONAL DIABETES MELLITUS IN PREGNANCY, UNSPECIFIED CONTROL: ICD-10-CM

## 2025-02-04 DIAGNOSIS — O24.414 INSULIN CONTROLLED GESTATIONAL DIABETES MELLITUS (GDM) DURING PREGNANCY, ANTEPARTUM: Primary | ICD-10-CM

## 2025-02-04 PROCEDURE — G0108 DIAB MANAGE TRN  PER INDIV: HCPCS | Mod: 95 | Performed by: NUTRITIONIST

## 2025-02-04 RX ORDER — PEN NEEDLE, DIABETIC 30 GX3/16"
1 NEEDLE, DISPOSABLE MISCELLANEOUS DAILY
Qty: 100 EACH | Refills: 1 | Status: SHIPPED | OUTPATIENT
Start: 2025-02-04

## 2025-02-04 RX ORDER — BLOOD PRESSURE TEST KIT
1 KIT MISCELLANEOUS DAILY
Qty: 100 EACH | Refills: 4 | Status: SHIPPED | OUTPATIENT
Start: 2025-02-04

## 2025-02-04 RX ORDER — HUMAN INSULIN 100 [IU]/ML
8 INJECTION, SUSPENSION SUBCUTANEOUS AT BEDTIME
Qty: 15 ML | Refills: 3 | Status: SHIPPED | OUTPATIENT
Start: 2025-02-04

## 2025-02-04 NOTE — LETTER
2/4/2025         RE: Mei Tello  [id number] [facility] [address]        Dear Colleague,    Thank you for referring your patient, Mei Tello, to the [facility]. Please see a copy of my visit note below.    Diabetes and Pregnancy Follow-up  Type of Service: Telephone Visit    Originating Location (Patient Location): Home  Distant Location (Provider Location): [facility], [place], [place]  Mode of Communication:  Video     Video Visit Start Time: 1:59 PM  Video Visit End Time: 2:29 PM    How would patient like to obtain AVS? MyChart    Subjective/Objective:    Mei Tello was called for insulin instruction education.  Last date of communication for GDM was: 1/21/25 with [facility] dietitian.    Gestational diabetes is being managed with diet and medications (insulin instruction today)    Taking diabetes medications: Injectable Medications: Novolin NPH - start with 8 units at bedtime.  Increase 2 units every 2 days until fasting blood sugar is consistently under 95 mg/dL.      Estimated Date of Delivery: Jul 14, 2025    Blood Glucose/Ketone Log:    Patient reported fasting blood sugar readings for past week 101-110 mg/dL.  1 hour post meals in target range.  No ketone testing ordered.      Assessment:  Referred for insulin start due to high fasting glucoses.  Discussed the action, peak, and duration of NPH insulin.  Instructed patient on the use of an insulin pen including: mixing the insulin, attaching a pen needle, priming the pen, administering the dose, removing the pen needle, and appropriate disposal of the pen needle. Instructed patient to administer insulin into fat tissue on the sides of her abdomen using a new site for each injection. Discussed signs, symptoms, and preferred treatment of hypoglycemia. Prescriptions for NPH insulin, pen needles, and alcohol wipes electronically sent to patient's pharmacy.     Mei shaunna  her GDM education through [facility].  She reports that she is following the meal plan given, monitoring 4 times/day,  walking 5 days/week and is just here for insulin instruction.  She is somewhat aware of insulin injections as her father has Type 1 diabetes.        Plan/Response:  Start NPH insulin as 8 units once a day at bedtime.   -increase by 2 units every 2 days until fasting blood sugars are consistently below 95 mg/dL  -ask the pharmacist to instruct you on the use of the insulin pen  -watch this short video for instructions (also check out the linked written material): https://www.[employer].org/device  -you will likely receive 1 box (5 pens) of Novolin NPH insulin from the pharmacy.  Place 4 of the pens in the refrigerator. Keep the 1 pen you are using at room temperature (just make sure it's not in direct sunlight).   -a Novolin NPH pen can be at room temperature for 14 days.  Make sure to store it with the cap on.  -insulin needs to be injected into fat tissue. Most people use their abdominal area.  Just make sure to stay away from your belly button, bruises, and/or surgical incisions. Choose a new spot each day for the injection.   -use a new pen needle for each injection  -dispose of used pen needles in a thick, plastic container      2.  Keep something with you for treating a low blood sugar (below 60-80 mg/dL).  If your blood sugar is low, eat/drink ONE of the following:   -1/2 cup juice  -1/2 cup regular soda  -1 package fruit snacks  -4 glucose tablets  Then, wait 15 minutes and re-check blood sugar.  If it is not above 70 mg/dL, repeat the above.    3.  Follow-up in 2 weeks with educator to report readings (virtual appointment 2/12/25)    4.  Follow-up with endocrinology on 2/27/25 (virtual appointment)    5. Call Diabetes Education at [phone] or send a [email or web address] message with:              -questions or concerns              -any low blood sugars              -3 or more blood sugars above  target in a 7 day period      Marysol Ruiz RDN, SHERYLN, ELICIA   Time Spent: 30 minutes    Any diabetes medication dose changes were made via the CDE Protocol and Collaborative Practice Agreement with the patient's referring provider, endocrinology provider, and OB/GYN provider. A copy of this encounter was shared with the provider.

## 2025-02-04 NOTE — TELEPHONE ENCOUNTER
Patient referred by [facility] for insulin start due to elevated fasting blood sugar.  Pended order for your approval.  She is scheduled with you on 2/27/25.    Marysol Ruiz, YULIANAN, LDN, [facility]

## 2025-02-04 NOTE — PROGRESS NOTES
Diabetes and Pregnancy Follow-up  Type of Service: Telephone Visit    Originating Location (Patient Location): Home  Distant Location (Provider Location): [place], [place]  Mode of Communication:  Video     Video Visit Start Time: 1:59 PM  Video Visit End Time: 2:29 PM    How would patient like to obtain AVS? Cinthiaharmainor    Subjective/Objective:    Mei Tello was called for insulin instruction education.  Last date of communication for GDM was: 1/21/25 with [facility] dietitian.    Gestational diabetes is being managed with diet and medications (insulin instruction today)    Taking diabetes medications: Injectable Medications: Novolin NPH - start with 8 units at bedtime.  Increase 2 units every 2 days until fasting blood sugar is consistently under 95 mg/dL.      Estimated Date of Delivery: Jul 14, 2025    Blood Glucose/Ketone Log:    Patient reported fasting blood sugar readings for past week 101-110 mg/dL.  1 hour post meals in target range.  No ketone testing ordered.      Assessment:  Referred for insulin start due to high fasting glucoses.  Discussed the action, peak, and duration of NPH insulin.  Instructed patient on the use of an insulin pen including: mixing the insulin, attaching a pen needle, priming the pen, administering the dose, removing the pen needle, and appropriate disposal of the pen needle. Instructed patient to administer insulin into fat tissue on the sides of her abdomen using a new site for each injection. Discussed signs, symptoms, and preferred treatment of hypoglycemia. Prescriptions for NPH insulin, pen needles, and alcohol wipes electronically sent to patient's pharmacy.     Mei received her GDM education through [facility].  She reports that she is following the meal plan given, monitoring 4 times/day,  walking 5 days/week and is just here for insulin instruction.  She is somewhat aware of insulin injections as her father has Type 1 diabetes.         Plan/Response:  Start NPH insulin as 8 units once a day at bedtime.   -increase by 2 units every 2 days until fasting blood sugars are consistently below 95 mg/dL  -ask the pharmacist to instruct you on the use of the insulin pen  -watch this short video for instructions (also check out the linked written material): https://www.[employer].org/device  -you will likely receive 1 box (5 pens) of Novolin NPH insulin from the pharmacy.  Place 4 of the pens in the refrigerator. Keep the 1 pen you are using at room temperature (just make sure it's not in direct sunlight).   -a Novolin NPH pen can be at room temperature for 14 days.  Make sure to store it with the cap on.  -insulin needs to be injected into fat tissue. Most people use their abdominal area.  Just make sure to stay away from your belly button, bruises, and/or surgical incisions. Choose a new spot each day for the injection.   -use a new pen needle for each injection  -dispose of used pen needles in a thick, plastic container      2.  Keep something with you for treating a low blood sugar (below 60-80 mg/dL).  If your blood sugar is low, eat/drink ONE of the following:   -1/2 cup juice  -1/2 cup regular soda  -1 package fruit snacks  -4 glucose tablets  Then, wait 15 minutes and re-check blood sugar.  If it is not above 70 mg/dL, repeat the above.    3.  Follow-up in 2 weeks with educator to report readings (virtual appointment 2/12/25)    4.  Follow-up with endocrinology on 2/27/25 (virtual appointment)    5. Call Diabetes Education at [phone] or send a [employer] message with:              -questions or concerns              -any low blood sugars              -3 or more blood sugars above target in a 7 day period      Marysol Ruiz, HERNANDO, LDN, [facility]   Time Spent: 30 minutes    Any diabetes medication dose changes were made via the CDE Protocol and Collaborative Practice Agreement with the patient's referring provider, endocrinology provider, and  OB/GYN provider. A copy of this encounter was shared with the provider.

## 2025-02-04 NOTE — PATIENT INSTRUCTIONS
Ace Hurley:        Start NPH insulin as 8 units once a day at bedtime.   -increase by 2 units every 2 days until fasting blood sugars are consistently below 95 mg/dL  -you can also ask the pharmacist to instruct you on the use of the insulin pen  -watch this short video for instructions (also check out the linked written material): https://www.[employer].org/device  -you will likely receive 1 box (5 pens) of Novolin NPH insulin from the pharmacy.  Place 4 of the pens in the refrigerator. Keep the 1 pen you are using at room temperature (just make sure it's not in direct sunlight).   -a Novolin NPH pen can be at room temperature for 28 days.  Make sure to store it with the cap on.  -insulin needs to be injected into fat tissue. Most people use their abdominal area.  Just make sure to stay away from your belly button, bruises, and/or surgical incisions. Choose a new spot each day for the injection.   -use a new pen needle for each injection  -dispose of used pen needles in a thick, plastic container      2.  Keep something with you for treating a low blood sugar (below 60-80 mg/dL).  If your blood sugar is low, eat/drink ONE of the following:   -1/2 cup juice  -1/2 cup regular soda  -1 package fruit snacks  -4 glucose tablets  Then, wait 15 minutes and re-check blood sugar.  If it is not above 70 mg/dL, repeat the above.      3. Follow up with Manjula on February 12th via video visit at 2:30 PM     4. Call Diabetes Education at [phone] or send a [employer] (?????) message with:              -questions or concerns              -any low blood sugars              -3 or more blood sugars above target in a 7 day period     Marysol Ruiz RD, LD, [facility]   Adult Diabetes Education Triage #[phone]  Diabetes Education Scheduling #[phone]

## 2025-02-12 ENCOUNTER — VIRTUAL VISIT (OUTPATIENT)
Dept: EDUCATION SERVICES | Facility: CLINIC | Age: 40
End: 2025-02-12
Payer: COMMERCIAL

## 2025-02-12 DIAGNOSIS — O24.414 INSULIN CONTROLLED GESTATIONAL DIABETES MELLITUS (GDM) DURING PREGNANCY, ANTEPARTUM: Primary | ICD-10-CM

## 2025-02-12 PROCEDURE — 98967 PH1 ASSMT&MGMT NQHP 11-20: CPT | Mod: 93 | Performed by: DIETITIAN, REGISTERED

## 2025-02-12 NOTE — LETTER
2/12/2025         RE: Mei Tello  [address] [facility] [address]        Dear Colleague,    Thank you for referring your patient, Mei Tello, to the [facility] [place]. Please see a copy of my visit note below.      Gestational Diabetes Follow-up    Type of service:  Video Visit    If the video visit is dropped, the video visit invitation should be resent by: Text to cell phone: [phone]    Originating Location (pt. Location): Home  Distant Location (provider location): Offsite  Mode of Communication:  Video Conference via CL3VER    Video Start Time:  2:35 pm  Video End Time (time video stopped): 2:55 pm    How would patient like to obtain AVS? MyChart      Subjective/Objective:    Mei Tello sent in blood glucose log for review. Last date of communication was: 2/4/25.  Received GDM instruction through [facility] dietitian.     Gestational diabetes is being managed with medications    Taking diabetes medications: yes:     Diabetes Medication(s)       Insulin       insulin NPH (NOVOLIN N FLEXPEN) 100 UNIT/ML injection Inject 8 Units subcutaneously at bedtime. Increase 2 units every 2 days until fasting blood sugar is consistently under  95 mg/dL.  Max dose 20 units/day.            Estimated Date of Delivery: Jul 14, 2025    Blood Glucose/Ketone Log:    Date Ketones Fasting Post Breakfast Post Lunch Post Supper   2/6  98 144 114 119   2/7  91  124 100   2/8  104 125 129 123   2/9  95 118 120 167 (chinese take out)   2/10  98 119 134 138   2/11  98 112 140 98   2/12  97 107 134       NPH started 2/5 (8 units at bedtime). Has been increasing 2 units as instructed.  Last night started 14 units at bedtime.       Assessment:    Ketones: --.   Fasting blood glucoses: 14% in target.  After breakfast: 83% in target.  Before lunch: -% in target.  After lunch: 85% in target.  Before dinner: -% in target.  After dinner: 83% in target.    Meal plan is  going well. Some questions with carb amounts of some foods. Discussed that Calorie Raffi and My Fitness Pal can be helpful apps for looking up foods and meals for carb information.     No other questions today.     Plan/Response:  Give 14 units again tonight. If still 95 or higher tomorrow, then increase to 16 units at night. Continue to increase by 2 units every 2 days until fasting blood sugar consistently <95 mg/dL.   [facility] and [facility]PH dose.   Endocrinology follow up scheduled 2/27/25.       Manjula Alvarenga, HERNANDO, LD, CDCES  Time spent 20 min    Any diabetes medication dose changes were made via the CDE Protocol and Collaborative Practice Agreement with the patient's endocrinology provider. A copy of this encounter was shared with the provider.

## 2025-02-12 NOTE — PROGRESS NOTES
Gestational Diabetes Follow-up    Type of service:  Video Visit    If the video visit is dropped, the video visit invitation should be resent by: Text to cell phone: [phone]    Originating Location (pt. Location): Home  Distant Location (provider location): Offsite  Mode of Communication:  Video Conference via [employer]    Video Start Time:  2:35 pm  Video End Time (time video stopped): 2:55 pm    How would patient like to obtain AVS? MyChart      Subjective/Objective:    Mei Tello sent in blood glucose log for review. Last date of communication was: 2/4/25.  Received GDM instruction through [facility] dietitian.     Gestational diabetes is being managed with medications    Taking diabetes medications: yes:     Diabetes Medication(s)       Insulin       insulin NPH (NOVOLIN N FLEXPEN) 100 UNIT/ML injection Inject 8 Units subcutaneously at bedtime. Increase 2 units every 2 days until fasting blood sugar is consistently under  95 mg/dL.  Max dose 20 units/day.            Estimated Date of Delivery: Jul 14, 2025    Blood Glucose/Ketone Log:    Date Ketones Fasting Post Breakfast Post Lunch Post Supper   2/6  98 144 114 119   2/7  91  124 100   2/8  104 125 129 123   2/9  95 118 120 167 (chinese take out)   2/10  98 119 134 138   2/11  98 112 140 98   2/12  97 107 134       NPH started 2/5 (8 units at bedtime). Has been increasing 2 units as instructed.  Last night started 14 units at bedtime.       Assessment:    Ketones: --.   Fasting blood glucoses: 14% in target.  After breakfast: 83% in target.  Before lunch: -% in target.  After lunch: 85% in target.  Before dinner: -% in target.  After dinner: 83% in target.    Meal plan is going well. Some questions with carb amounts of some foods. Discussed that Calorie Raffi and My Fitness Pal can be helpful apps for looking up foods and meals for carb information.     No other questions today.     Plan/Response:  Give 14 units again tonight. If still 95 or  higher tomorrow, then increase to 16 units at night. Continue to increase by 2 units every 2 days until fasting blood sugar consistently <95 mg/dL.   [facility]PH dose.   Endocrinology follow up scheduled 2/27/25.       Manjula Alvarenga, HERNANDO, SHERYL, CDCES  Time spent 20 min    Any diabetes medication dose changes were made via the CDE Protocol and Collaborative Practice Agreement with the patient's endocrinology provider. A copy of this encounter was shared with the provider.

## 2025-02-20 ENCOUNTER — VIRTUAL VISIT (OUTPATIENT)
Dept: ENDOCRINOLOGY | Facility: CLINIC | Age: 40
End: 2025-02-20
Payer: COMMERCIAL

## 2025-02-20 ENCOUNTER — OFFICE VISIT (OUTPATIENT)
Dept: MATERNAL FETAL MEDICINE | Facility: CLINIC | Age: 40
End: 2025-02-20
Attending: OBSTETRICS & GYNECOLOGY
Payer: COMMERCIAL

## 2025-02-20 DIAGNOSIS — O24.414 INSULIN CONTROLLED GESTATIONAL DIABETES MELLITUS (GDM) DURING PREGNANCY, ANTEPARTUM: ICD-10-CM

## 2025-02-20 DIAGNOSIS — O99.210 OBESITY AFFECTING PREGNANCY, ANTEPARTUM, UNSPECIFIED OBESITY TYPE: ICD-10-CM

## 2025-02-20 DIAGNOSIS — Z36.2 ENCOUNTER FOR FOLLOW-UP ULTRASOUND OF FETAL ANATOMY: Primary | ICD-10-CM

## 2025-02-20 DIAGNOSIS — O24.414 INSULIN CONTROLLED GESTATIONAL DIABETES MELLITUS (GDM) IN SECOND TRIMESTER: Primary | ICD-10-CM

## 2025-02-20 PROBLEM — O24.419 GESTATIONAL DIABETES MELLITUS: Status: ACTIVE | Noted: 2024-12-30

## 2025-02-20 PROBLEM — B97.7 HUMAN PAPILLOMA VIRUS INFECTION: Status: ACTIVE | Noted: 2024-05-03

## 2025-02-20 PROBLEM — N97.1 OBSTRUCTION OF FALLOPIAN TUBE: Status: ACTIVE | Noted: 2024-06-19

## 2025-02-20 PROBLEM — R73.09 ABNORMAL GLUCOSE LEVEL: Status: ACTIVE | Noted: 2024-05-03

## 2025-02-20 RX ORDER — BLOOD SUGAR DIAGNOSTIC
STRIP MISCELLANEOUS
COMMUNITY
Start: 2025-01-14

## 2025-02-20 RX ORDER — BLOOD-GLUCOSE METER
EACH MISCELLANEOUS
COMMUNITY
Start: 2025-01-14

## 2025-02-20 RX ORDER — LANCETS
EACH MISCELLANEOUS
COMMUNITY
Start: 2025-01-14

## 2025-02-20 RX ORDER — HUMAN INSULIN 100 [IU]/ML
INJECTION, SUSPENSION SUBCUTANEOUS
Qty: 15 ML | Refills: 4 | Status: SHIPPED | OUTPATIENT
Start: 2025-02-20

## 2025-02-20 NOTE — NURSING NOTE
"Patient reports positive fetal movement, denies pain, leaking of fluid, or bleeding.  Reports blood sugar values \"better\" after starting insulin.   Education provided to patient on today's ultrasound.  SBAR given to ADRYAN ROJAS, see their note in Epic.      "

## 2025-02-20 NOTE — PROGRESS NOTES
[facility]  ENDOCRINOLOGY    Gestational Diabetes 2025    Mei Tello, 1985, [id number]          Reason for visit      1. Insulin controlled gestational diabetes mellitus (GDM) in second trimester        HPI     Mei Tello is a very pleasant 39 year old old female who presents for GESTATIONAL Diabetes Mellitus.  She is currently 19w3d   pregnant . Due date is 25   Diagnosed with GDM based on an OGTT. She hasnot had  GDM in prior pregnancies.   Current carbohydrate intake:consistent with recommendations of 30g-60g-60g.  I have reviewed her blood glucose logs and note that the:  Fasting readings  are:in range on current regimen  Postprandial readings are:in range on current regimen  Current NPH dose: 18  Current Prandial insulin:   Blood glucose logs/meter brought in and data reviewed and incorporated into decision-making.  [facility]     Therapy/Interventions in the past:  She has been seen by the Diabetes Educator- and has received instruction on carbohydrate counting and  consistency.  Records from referring provider and other sources have also been reviewed and incorporated into decision-making.      TODAY:    Mei is seen today via Video Visit after starting insulin for GDM. She has supplied BG today and she is doing a great job of it. She has titrated appropriately and did so last evening. She is not yet feeling meaningful movement and is having no swelling.       Blood Glucose Numbers:      Fasting  94  1 hour after:  B  136  L  122  D  102    2/15  Fasting  89  1 hour after:  B  128  L  136  D  132    2/16  Fasting  93  1 hour after:  B  158  L  138  D  91    2/17  Fasting  95  1 hour after:  B  114  L  118  D  138    2/18  Fasting  94  1 hour after:  B  111  L  156  D  91    2/19  Fasting  98  1 hour after:  B  105  L  116  D  134    2/20  Fasting 92    Past Medical History       Patient Active Problem List   Diagnosis    Cervical high  risk HPV (human papillomavirus) test positive    History of melanoma    Morbid obesity (H)    Impaired fasting glucose    Abnormal glucose level    Gestational diabetes mellitus    Human papilloma virus infection    Obstruction of fallopian tube        Past Surgical History     Past Surgical History:   Procedure Laterality Date    CHOLECYSTECTOMY      ZZC LAP,CHOLECYSTECTOMY/EXPLORE      Description: Cholecystectomy Laparoscopic;  Recorded: 10/30/2008;       Family History     Family History   Problem Relation Age of Onset    Hypertension Mother     Heart Disease Father     Bipolar Disorder Sister     Bipolar Disorder Sister     Breast Cancer Maternal Aunt 58.00       Social History     Social History     Tobacco Use    Smoking status: Never    Smokeless tobacco: Never   Vaping Use    Vaping status: Never Used       Review of Systems     Patient has no polyuria or polydipsia, no chest pain, dyspnea or TIA's, no numbness, tingling or pain in extremities  Remainder negative except as noted in HPI.      Vital Signs     LMP 10/07/2024   Wt Readings from Last 3 Encounters:   04/25/24 118.2 kg (260 lb 9.6 oz)   05/18/23 116.6 kg (257 lb)   05/16/22 114.7 kg (252 lb 12.8 oz)       Physical Exam     Constitutional:  Well developed, Well nourished  HENT:  Normocephalic,   Neck: normal in appearance  Eyes:  PERRL, Conjunctiva pink  Respiratory:  No respiratory distress  Skin: No acanthosis nigricans, lipoatrophy or lipodystrophy  Neurologic:  Alert & oriented x 3, nonfocal  Psychiatric:  Affect, Mood, Insight appropriate    Assessment     1. Insulin controlled gestational diabetes mellitus (GDM) in second trimester        Plan     1. GESTATIONAL DIABETES-  Adjust dose as follows:    -NPH insulin 18 units. Increase by 2 units every 2 days to keep fasting blood glucose below 95mg/dL  -Novolog 0  units with breakfast  -Novolog 0 units with lunch   -Novolog 0 units with dinner  -Increase by 0 units every 2 days to keep 1 hour  "after meal blood glucose less than 140mg/dL    We reviewed glucose goals of fasting blood glucose <95 mg/dL and 1 hour post prandial blood glucose of <140 mg/dL.    Monitor blood sugar 4 times daily: Fasting  and 1 hour after each meal.  Contact  this clinic [phone] if blood glucose is not within the above-mentioned goals.     We discussed the importance of excellent glycemic control during pregnancy to limit complications such as fetal macrosomia, shoulder dystocia,  hypoglycemia and hyperbilirubinemia.  I have discussed the patient's increased risk of recurrent GDM and/or development of type 2 diabetes later in life.      Follow-up in 2 weeks      Lizzeth Schaefer NP  2025        Lab Results     No results found for: \"HGBA1C\", \"CREATININE\", \"MICROALBUR\"    Cholesterol   Date Value Ref Range Status   2024 204 (H) <200 mg/dL Final     Direct Measure HDL   Date Value Ref Range Status   2024 49 (L) >=50 mg/dL Final     Triglycerides   Date Value Ref Range Status   2024 173 (H) <150 mg/dL Final       [unfilled]      Current Medications     Visit Start time:  730    Visit Stop time: 075        Virtual Visit Details    Type of service:  Video Visit     Originating Location (pt. Location): Home    Distant Location (provider location):  On-site  Platform used for Video Visit: [employer]us    "

## 2025-02-20 NOTE — Clinical Note
"2/20/2025      Mei Tello  [address] [facility] [address]      Dear Colleague,    Thank you for referring your patient, Mei Tello, to the [facility]. Please see a copy of my visit note below.    [facility]  ENDOCRINOLOGY    Gestational Diabetes 2/20/2025    Mei Tello, 1985, [id number]          Reason for visit      No diagnosis found.    HPI     Mei Tello is a very pleasant 39 year old old female who presents for GESTATIONAL Diabetes Mellitus.  She is currently***  weeks pregnant GP. Due date is*** 2015  Diagnosed with GDM based on an OGTT. She has{Blank single:74574::\"had\",\"not had\"}  GDM in prior pregnancies.   Current carbohydrate intake:consistent with recommendations of 30g-60g-60g.  I have reviewed her blood glucose logs and note that the:  Fasting readings  are:{Blank single:36759::\"in range\",\"above range\"} on current regimen  Postprandial readings are:{Blank single:23874::\"in range\",\"above range\"} on current regimen  Current NPH dose:  Current Prandial insulin:   Blood glucose logs/meter brought in and data reviewed and incorporated into decision-making.  Planned delivery at:   OBN:     Therapy/Interventions in the past:  She has been seen by the Diabetes Educator- and has received instruction on carbohydrate counting and  consistency.  Records from referring provider and other sources have also been reviewed and incorporated into decision-making.      TODAY:    Past Medical History       Patient Active Problem List   Diagnosis    Cervical high risk HPV (human papillomavirus) test positive    History of melanoma    Morbid obesity (H)    Impaired fasting glucose    Abnormal glucose level    Gestational diabetes mellitus    Human papilloma virus infection    Obstruction of fallopian tube        Past Surgical History     Past Surgical History:   Procedure Laterality Date    CHOLECYSTECTOMY      ZZC " "LAP,CHOLECYSTECTOMY/EXPLORE      Description: Cholecystectomy Laparoscopic;  Recorded: 10/30/2008;       Family History     Family History   Problem Relation Age of Onset    Hypertension Mother     Heart Disease Father     Bipolar Disorder Sister     Bipolar Disorder Sister     Breast Cancer Maternal Aunt 58.00       Social History     Social History     Tobacco Use    Smoking status: Never    Smokeless tobacco: Never   Vaping Use    Vaping status: Never Used       Review of Systems     Patient has {:535134}  Remainder negative except as noted in HPI.      Vital Signs     LMP 10/07/2024   Wt Readings from Last 3 Encounters:   04/25/24 118.2 kg (260 lb 9.6 oz)   05/18/23 116.6 kg (257 lb)   05/16/22 114.7 kg (252 lb 12.8 oz)       Physical Exam     GENERAL: Pleasant, alert, appropriate appearance for age. No acute distress, ***  HEENT: Normocephalic, atraumatic  NECK: Supple, no masses or  lymph nodes.  THYROID: No nodules or enlargement. Non-tender, no bruit  CHEST/RESPIRATORY: Normal chest wall and respirations. Clear to auscultation.  CARDIOVASCULAR: Regular rate and rhythm. S1, S2, no murmur, click, gallop, or rubs.  ABDOMEN: Gravid   LYMPHATIC: No palpable nodes in neck, supraclavicular,  SKIN: No melanosis,  ecchymosis,  vitiligo. No acanthosis nigricans  NEURO:  Non-focal, normal DTRs; no tremor.  PSYCH: Alert and oriented -appropriate affect. Orientation, judgement and memory appear intact.  MSK: No joint abnormalities, FROM in all four extremities. No kyphosis    Assessment     No diagnosis found.    Plan             Lizzeth Schaefer NP  2/20/2025        Lab Results     No results found for: \"HGBA1C\", \"CREATININE\", \"MICROALBUR\"    Cholesterol   Date Value Ref Range Status   04/25/2024 204 (H) <200 mg/dL Final     Direct Measure HDL   Date Value Ref Range Status   04/25/2024 49 (L) >=50 mg/dL Final     Triglycerides   Date Value Ref Range Status   04/25/2024 173 (H) <150 mg/dL Final       [unfilled]      Current " "Medications         Blood Glucose Numbers:    2/14  Fasting  94  1 hour after:  B  136  L  122  D  102    2/15  Fasting  89  1 hour after:  B  128  L  136  D  132    2/16  Fasting  93  1 hour after:  B  158  L  138  D  91    2/17  Fasting  95  1 hour after:  B  114  L  118  D  138    2/18  Fasting  94  1 hour after:  B  111  L  156  D  91    2/19  Fasting  98  1 hour after:  B  105  L  116  D  134    2/20  Fasting 92      Virtual Visit Details    Type of service:  Video Visit     Originating Location (pt. Location): {video visit patient location:551588::\"Home\"}  {PROVIDER LOCATION On-site should be selected for visits conducted from your clinic location or adjoining [facility] hospital, academic office, or other nearby [facility] building. Off-site should be selected for all other provider locations, including home:974674}  Distant Location (provider location):  {virtual location provider:427916}  Platform used for Video Visit: {Virtual Visit Platforms:953383::\"[employer]\"}        Again, thank you for allowing me to participate in the care of your patient.        Sincerely,        Lizzeth Schaefer NP    Electronically signed"

## 2025-02-27 ENCOUNTER — VIRTUAL VISIT (OUTPATIENT)
Dept: ENDOCRINOLOGY | Facility: CLINIC | Age: 40
End: 2025-02-27
Payer: COMMERCIAL

## 2025-02-27 DIAGNOSIS — O24.414 INSULIN CONTROLLED GESTATIONAL DIABETES MELLITUS (GDM) IN SECOND TRIMESTER: ICD-10-CM

## 2025-02-27 RX ORDER — HUMAN INSULIN 100 [IU]/ML
INJECTION, SUSPENSION SUBCUTANEOUS
Qty: 15 ML | Refills: 4 | Status: SHIPPED | OUTPATIENT
Start: 2025-02-27

## 2025-02-27 NOTE — Clinical Note
"2/27/2025      Mei Tello  [address] [address] [address]      Dear Colleague,    Thank you for referring your patient, Mei Tello, to the [facility] SPECIALTY [facility]. Please see a copy of my visit note below.    [facility]  ENDOCRINOLOGY    Gestational Diabetes 2/27/2025    Mei Tello, 1985, [id number]          Reason for visit      No diagnosis found.    HPI     Mei Tello is a very pleasant 39 year old old female who presents for GESTATIONAL Diabetes Mellitus.  She is currently***  weeks pregnant GP. Due date is*** 2015  Diagnosed with GDM based on an OGTT. She has{Blank single:07554::\"had\",\"not had\"}  GDM in prior pregnancies.   Current carbohydrate intake:consistent with recommendations of 30g-60g-60g.  I have reviewed her blood glucose logs and note that the:  Fasting readings  are:{Blank single:45404::\"in range\",\"above range\"} on current regimen  Postprandial readings are:{Blank single:77016::\"in range\",\"above range\"} on current regimen  Current NPH dose:  Current Prandial insulin:   Blood glucose logs/meter brought in and data reviewed and incorporated into decision-making.  Planned delivery at:   OBN:     Therapy/Interventions in the past:  She has been seen by the Diabetes Educator- and has received instruction on carbohydrate counting and  consistency.  Records from referring provider and other sources have also been reviewed and incorporated into decision-making.      TODAY:    Past Medical History       Patient Active Problem List   Diagnosis    Cervical high risk HPV (human papillomavirus) test positive    History of melanoma    Morbid obesity (H)    Impaired fasting glucose    Abnormal glucose level    Gestational diabetes mellitus    Human papilloma virus infection    Obstruction of fallopian tube        Past Surgical History     Past Surgical History:   Procedure Laterality Date    CHOLECYSTECTOMY      ZZC " "LAP,CHOLECYSTECTOMY/EXPLORE      Description: Cholecystectomy Laparoscopic;  Recorded: 10/30/2008;       Family History     Family History   Problem Relation Age of Onset    Hypertension Mother     Heart Disease Father     Bipolar Disorder Sister     Bipolar Disorder Sister     Breast Cancer Maternal Aunt 58.00       Social History     Social History     Tobacco Use    Smoking status: Never    Smokeless tobacco: Never   Vaping Use    Vaping status: Never Used       Review of Systems     Patient has {:957888}  Remainder negative except as noted in HPI.      Vital Signs     LMP 10/07/2024   Wt Readings from Last 3 Encounters:   04/25/24 118.2 kg (260 lb 9.6 oz)   05/18/23 116.6 kg (257 lb)   05/16/22 114.7 kg (252 lb 12.8 oz)       Physical Exam     GENERAL: Pleasant, alert, appropriate appearance for age. No acute distress, ***  HEENT: Normocephalic, atraumatic  NECK: Supple, no masses or  lymph nodes.  THYROID: No nodules or enlargement. Non-tender, no bruit  CHEST/RESPIRATORY: Normal chest wall and respirations. Clear to auscultation.  CARDIOVASCULAR: Regular rate and rhythm. S1, S2, no murmur, click, gallop, or rubs.  ABDOMEN: Gravid   LYMPHATIC: No palpable nodes in neck, supraclavicular,  SKIN: No melanosis,  ecchymosis,  vitiligo. No acanthosis nigricans  NEURO:  Non-focal, normal DTRs; no tremor.  PSYCH: Alert and oriented -appropriate affect. Orientation, judgement and memory appear intact.  MSK: No joint abnormalities, FROM in all four extremities. No kyphosis    Assessment     No diagnosis found.    Plan             Lizzeth Schaefer NP  2/27/2025      This note has been dictated using voice recognition software.  Any grammatical or context distortions are unintentional and inherent to the software.     Lab Results     No results found for: \"HGBA1C\", \"CREATININE\", \"MICROALBUR\"    Cholesterol   Date Value Ref Range Status   04/25/2024 204 (H) <200 mg/dL Final     Direct Measure HDL   Date Value Ref Range Status " "  04/25/2024 49 (L) >=50 mg/dL Final     Triglycerides   Date Value Ref Range Status   04/25/2024 173 (H) <150 mg/dL Final       [unfilled]      Current Medications         Blood Glucose Numbers:    2/21  Fasting  105  1hour after:  B  97  L  136  D  153    2/22  Fasting  93  1 hour after:  B  139  L  x  D  x    2/23  Fasting  89  1 hour after:  B  118  L  139  D  115    2/24  Fasting  101  1 hour after:  B  120  L  116  D  101    2/25  Fasting 102  1 hour after:  B  131  L  151  D  125    2/26  Fasting  101  1 hour after:  B  120  L  123  D  114    2/27  Fasting 107  1 hour after:  B  127      Virtual Visit Details    Type of service:  Video Visit     Originating Location (pt. Location): {video visit patient location:501502::\"Home\"}  {PROVIDER LOCATION On-site should be selected for visits conducted from your clinic location or adjoining [facility] hospital, academic office, or other nearby [facility] building. Off-site should be selected for all other provider locations, including home:064258}  Distant Location (provider location):  {virtual location provider:437352}  Platform used for Video Visit: {Virtual Visit Platforms:650717::\"[employer]\"}                        Again, thank you for allowing me to participate in the care of your patient.        Sincerely,        Lizzeth Schaefer NP    Electronically signed"

## 2025-02-27 NOTE — PROGRESS NOTES
"[facility]  ENDOCRINOLOGY    Gestational Diabetes 2025    Mei Tello, 1985, [id number]          Reason for visit      1. Insulin controlled gestational diabetes mellitus (GDM) in second trimester        HPI     Mei Tello is a very pleasant 39 year old old female who presents for GESTATIONAL Diabetes Mellitus.  She is currently 19w3d   pregnant . Due date is 25   Diagnosed with GDM based on an OGTT. She hasnot had  GDM in prior pregnancies.   Current carbohydrate intake:consistent with recommendations of 30g-60g-60g.  I have reviewed her blood glucose logs and note that the:  Fasting readings  are:in range on current regimen  Postprandial readings are:in range on current regimen  Current NPH dose: 20  Current Prandial insulin:   Blood glucose logs/meter brought in and data reviewed and incorporated into decision-making.  Planned delivery at:   OB[place]N: [place]       Therapy/Interventions in the past:  She has been seen by the Diabetes Educator- and has received instruction on carbohydrate counting and  consistency.  Records from referring provider and other sources have also been reviewed and incorporated into decision-making.      TODAY:    Mei is seen today in follow-up for GDM. She supplies BG to me today and she is having some trouble with her FBS. Will give it a bigger \"kick\" this evening and see if we can get out ahead of it. She is not yet feeling any fetal movement. She is not having any swelling. No current concerns from her OB.        Blood Glucose Numbers:      Fasting  105  1hour after:  B  97  L  136  D  153      Fasting  93  1 hour after:  B  139  L  x  D  x      Fasting  89  1 hour after:  B  118  L  139  D  115      Fasting  101  1 hour after:  B  120  L  116  D  101      Fasting 102  1 hour after:  B  131  L  151  D  125      Fasting  101  1 hour after:  B  120  L  123  D  114      Fasting 107  1 hour after:  B  127        Past " Medical History       Patient Active Problem List   Diagnosis    Cervical high risk HPV (human papillomavirus) test positive    History of melanoma    Morbid obesity (H)    Impaired fasting glucose    Abnormal glucose level    Gestational diabetes mellitus    Human papilloma virus infection    Obstruction of fallopian tube        Past Surgical History     Past Surgical History:   Procedure Laterality Date    CHOLECYSTECTOMY      ZZC LAP,CHOLECYSTECTOMY/EXPLORE      Description: Cholecystectomy Laparoscopic;  Recorded: 10/30/2008;       Family History     Family History   Problem Relation Age of Onset    Hypertension Mother     Heart Disease Father     Bipolar Disorder Sister     Bipolar Disorder Sister     Breast Cancer Maternal Aunt 58.00       Social History     Social History     Tobacco Use    Smoking status: Never    Smokeless tobacco: Never   Vaping Use    Vaping status: Never Used       Review of Systems     Patient has no polyuria or polydipsia, no chest pain, dyspnea or TIA's, no numbness, tingling or pain in extremities  Remainder negative except as noted in HPI.      Vital Signs     LMP 10/07/2024   Wt Readings from Last 3 Encounters:   04/25/24 118.2 kg (260 lb 9.6 oz)   05/18/23 116.6 kg (257 lb)   05/16/22 114.7 kg (252 lb 12.8 oz)       Physical Exam     Constitutional:  Well developed, Well nourished  HENT:  Normocephalic,   Neck: normal in appearance  Eyes:  PERRL, Conjunctiva pink  Respiratory:  No respiratory distress  Skin: No acanthosis nigricans, lipoatrophy or lipodystrophy  Neurologic:  Alert & oriented x 3, nonfocal  Psychiatric:  Affect, Mood, Insight appropriate    Assessment     1. Insulin controlled gestational diabetes mellitus (GDM) in second trimester        Plan     1. GESTATIONAL DIABETES-  Adjust dose as follows:     -NPH insulin 26 units. Increase by 2 units every 2 days to keep fasting blood glucose below 95mg/dL  -Novolog 0  units with breakfast  -Novolog 0 units with lunch  "  -Novolog 0 units with dinner  -Increase by 0 units every 2 days to keep 1 hour after meal blood glucose less than 140mg/dL     We reviewed glucose goals of fasting blood glucose <95 mg/dL and 1 hour post prandial blood glucose of <140 mg/dL.    Monitor blood sugar 4 times daily: Fasting  and 1 hour after each meal.  Contact  this clinic [phone] if blood glucose is not within the above-mentioned goals.      We discussed the importance of excellent glycemic control during pregnancy to limit complications such as fetal macrosomia, shoulder dystocia,  hypoglycemia and hyperbilirubinemia.  I have discussed the patient's increased risk of recurrent GDM and/or development of type 2 diabetes later in life.       Follow-up in 2 weeks        Lizzeth Schaefer NP  2025      Lab Results     No results found for: \"HGBA1C\", \"CREATININE\", \"MICROALBUR\"    Cholesterol   Date Value Ref Range Status   2024 204 (H) <200 mg/dL Final     Direct Measure HDL   Date Value Ref Range Status   2024 49 (L) >=50 mg/dL Final     Triglycerides   Date Value Ref Range Status   2024 173 (H) <150 mg/dL Final       [unfilled]      Current Medications     Visit Start time: 1400    Visit stop time: 1420      Virtual Visit Details    Type of service:  Video Visit     Originating Location (pt. Location): Home    Distant Location (provider location):  On-site  Platform used for Video Visit: Agus                    "

## 2025-03-05 ENCOUNTER — TRANSFERRED RECORDS (OUTPATIENT)
Dept: HEALTH INFORMATION MANAGEMENT | Facility: CLINIC | Age: 40
End: 2025-03-05
Payer: COMMERCIAL

## 2025-03-14 ENCOUNTER — VIRTUAL VISIT (OUTPATIENT)
Dept: ENDOCRINOLOGY | Facility: CLINIC | Age: 40
End: 2025-03-14
Payer: COMMERCIAL

## 2025-03-14 DIAGNOSIS — O24.414 INSULIN CONTROLLED GESTATIONAL DIABETES MELLITUS (GDM) IN SECOND TRIMESTER: Primary | ICD-10-CM

## 2025-03-14 PROCEDURE — 98006 SYNCH AUDIO-VIDEO EST MOD 30: CPT | Performed by: NURSE PRACTITIONER

## 2025-03-14 PROCEDURE — G2211 COMPLEX E/M VISIT ADD ON: HCPCS | Performed by: NURSE PRACTITIONER

## 2025-03-14 NOTE — Clinical Note
"3/14/2025      Mei Tello  [address] [address] [facility] [place] [address]      Dear Colleague,    Thank you for referring your patient, Mei Tello, to the [facility]. Please see a copy of my visit note below.                                                            Blood Glucose Numbers:    3/8  Fasting  103  1hour after:  B  126  L  136  D  114    3/9  Fasting  95  1 hour after:  B  x  L  x  D  118    3/10  Fasting  98  1 hour after:  B  153  L  148  D  110    3/11  Fasting  101  1 hour after:  B  95  L  96  D  108    3/12  Fasting  83  1 hour after:  B  113  L  117  D  120    3/13  Fasting  90  1 hour after:  B  119  L  110  D  113    3/14  Fasting 89  1 hour after:  B  126  L  148    Virtual Visit Details    Type of service:  Video Visit     Originating Location (pt. Location): {video visit patient location:654869::\"Home\"}  {PROVIDER LOCATION On-site should be selected for visits conducted from your clinic location or adjoining [facility] hospital, academic office, or other nearby [facility] building. Off-site should be selected for all other provider locations, including home:367024}  Distant Location (provider location):  {virtual location provider:134785}  Platform used for Video Visit: {Virtual Visit Platforms:888694::\"BeliefNet\"}                            Again, thank you for allowing me to participate in the care of your patient.        Sincerely,        Lizzeth Schaefer NP    Electronically signed"

## 2025-03-14 NOTE — PROGRESS NOTES
[facility]  ENDOCRINOLOGY    Gestational Diabetes 3/18/2025    Mei Tello, 1985, [id number]          Reason for visit      1. Insulin controlled gestational diabetes mellitus (GDM) in second trimester        HPI     Mei Tello is a very pleasant 39 year old old female who presents for GESTATIONAL Diabetes Mellitus.  She is currently 22w3d   pregnant . Due date is 25   Diagnosed with GDM based on an OGTT. She hasnot had  GDM in prior pregnancies.   Current carbohydrate intake:consistent with recommendations of 30g-60g-60g.  I have reviewed her blood glucose logs and note that the:  Fasting readings  are:in range on current regimen  Postprandial readings are:in range on current regimen  Current NPH dose: 40  Current Prandial insulin:   Blood glucose logs/meter brought in and data reviewed and incorporated into decision-making.  Planned delivery at:   OBN: [facility]     Therapy/Interventions in the past:  She has been seen by the Diabetes Educator- and has received instruction on carbohydrate counting and  consistency.  Records from referring provider and other sources have also been reviewed and incorporated into decision-making.      TODAY:    Mei is seen today via Video Visit in follow-up for GDM. She has been titrating appropriately and has gotten ahead of her FBS. Postprandial readings look fine for the most part. Baby is moving appropriately and she is having no swelling at the present time. Follow-up with me in 2 weeks.       Blood Glucose Numbers:    3/8  Fasting  103  1hour after:  B  126  L  136  D  114    3/9  Fasting  95  1 hour after:  B  x  L  x  D  118    3/10  Fasting  98  1 hour after:  B  153  L  148  D  110    3/11  Fasting  101  1 hour after:  B  95  L  96  D  108    3/12  Fasting  83  1 hour after:  B  113  L  117  D  120    3/13  Fasting  90  1 hour after:  B  119  L  110  D  113    3/14  Fasting 89  1 hour after:  B  126  L  148    Past Medical History        Patient Active Problem List   Diagnosis    Cervical high risk HPV (human papillomavirus) test positive    History of melanoma    Morbid obesity (H)    Impaired fasting glucose    Abnormal glucose level    Gestational diabetes mellitus    Human papilloma virus infection    Obstruction of fallopian tube        Past Surgical History     Past Surgical History:   Procedure Laterality Date    CHOLECYSTECTOMY      ZZC LAP,CHOLECYSTECTOMY/EXPLORE      Description: Cholecystectomy Laparoscopic;  Recorded: 10/30/2008;       Family History     Family History   Problem Relation Age of Onset    Hypertension Mother     Heart Disease Father     Bipolar Disorder Sister     Bipolar Disorder Sister     Breast Cancer Maternal Aunt 58.00       Social History     Social History     Tobacco Use    Smoking status: Never    Smokeless tobacco: Never   Vaping Use    Vaping status: Never Used       Review of Systems     Patient has no polyuria or polydipsia, no chest pain, dyspnea or TIA's, no numbness, tingling or pain in extremities  Remainder negative except as noted in HPI.      Vital Signs     LMP 10/07/2024   Wt Readings from Last 3 Encounters:   04/25/24 118.2 kg (260 lb 9.6 oz)   05/18/23 116.6 kg (257 lb)   05/16/22 114.7 kg (252 lb 12.8 oz)       Physical Exam     Constitutional:  Well developed, Well nourished  HENT:  Normocephalic,   Neck: normal in appearance  Eyes:  PERRL, Conjunctiva pink  Respiratory:  No respiratory distress  Skin: No acanthosis nigricans, lipoatrophy or lipodystrophy  Neurologic:  Alert & oriented x 3, nonfocal  Psychiatric:  Affect, Mood, Insight appropriate    Assessment     1. Insulin controlled gestational diabetes mellitus (GDM) in second trimester        Plan     1. GESTATIONAL DIABETES-  Adjust dose as follows:     -NPH insulin 40 units. Increase by 2 units every 2 days to keep fasting blood glucose below 95mg/dL  -Novolog 0  units with breakfast  -Novolog 0 units with lunch   -Novolog 0 units  "with dinner  -Increase by 0 units every 2 days to keep 1 hour after meal blood glucose less than 140mg/dL     We reviewed glucose goals of fasting blood glucose <95 mg/dL and 1 hour post prandial blood glucose of <140 mg/dL.    Monitor blood sugar 4 times daily: Fasting  and 1 hour after each meal.  Contact  this clinic [phone] if blood glucose is not within the above-mentioned goals.      We discussed the importance of excellent glycemic control during pregnancy to limit complications such as fetal macrosomia, shoulder dystocia,  hypoglycemia and hyperbilirubinemia.  I have discussed the patient's increased risk of recurrent GDM and/or development of type 2 diabetes later in life.       Follow-up in 2 weeks           Lizzeth Schaefer NP  3/18/2025    Lab Results     No results found for: \"HGBA1C\", \"CREATININE\", \"MICROALBUR\"    Cholesterol   Date Value Ref Range Status   2024 204 (H) <200 mg/dL Final     Direct Measure HDL   Date Value Ref Range Status   2024 49 (L) >=50 mg/dL Final     Triglycerides   Date Value Ref Range Status   2024 173 (H) <150 mg/dL Final       [unfilled]      Current Medications     Visit start time: 1600    Visit stop time: 1620          Virtual Visit Details    Type of service:  Video Visit     Originating Location (pt. Location): Home    Distant Location (provider location):  On-site  Platform used for Video Visit: Agus                        "

## 2025-03-19 ENCOUNTER — OFFICE VISIT (OUTPATIENT)
Dept: MATERNAL FETAL MEDICINE | Facility: HOSPITAL | Age: 40
End: 2025-03-19
Attending: STUDENT IN AN ORGANIZED HEALTH CARE EDUCATION/TRAINING PROGRAM
Payer: COMMERCIAL

## 2025-03-19 ENCOUNTER — ANCILLARY PROCEDURE (OUTPATIENT)
Dept: ULTRASOUND IMAGING | Facility: HOSPITAL | Age: 40
End: 2025-03-19
Attending: STUDENT IN AN ORGANIZED HEALTH CARE EDUCATION/TRAINING PROGRAM
Payer: COMMERCIAL

## 2025-03-19 VITALS — DIASTOLIC BLOOD PRESSURE: 68 MMHG | SYSTOLIC BLOOD PRESSURE: 116 MMHG | HEART RATE: 76 BPM

## 2025-03-19 DIAGNOSIS — O36.5990 FETAL GROWTH RESTRICTION ANTEPARTUM: Primary | ICD-10-CM

## 2025-03-19 DIAGNOSIS — Z36.2 ENCOUNTER FOR FOLLOW-UP ULTRASOUND OF FETAL ANATOMY: ICD-10-CM

## 2025-03-19 DIAGNOSIS — O99.210 OBESITY AFFECTING PREGNANCY, ANTEPARTUM, UNSPECIFIED OBESITY TYPE: ICD-10-CM

## 2025-03-19 PROCEDURE — 76820 UMBILICAL ARTERY ECHO: CPT | Mod: 26 | Performed by: STUDENT IN AN ORGANIZED HEALTH CARE EDUCATION/TRAINING PROGRAM

## 2025-03-19 PROCEDURE — 76816 OB US FOLLOW-UP PER FETUS: CPT

## 2025-03-19 PROCEDURE — 76816 OB US FOLLOW-UP PER FETUS: CPT | Mod: 26 | Performed by: STUDENT IN AN ORGANIZED HEALTH CARE EDUCATION/TRAINING PROGRAM

## 2025-03-19 PROCEDURE — 99213 OFFICE O/P EST LOW 20 MIN: CPT | Mod: 25 | Performed by: STUDENT IN AN ORGANIZED HEALTH CARE EDUCATION/TRAINING PROGRAM

## 2025-03-19 PROCEDURE — 59025 FETAL NON-STRESS TEST: CPT | Mod: 26 | Performed by: STUDENT IN AN ORGANIZED HEALTH CARE EDUCATION/TRAINING PROGRAM

## 2025-03-19 PROCEDURE — 99212 OFFICE O/P EST SF 10 MIN: CPT | Performed by: STUDENT IN AN ORGANIZED HEALTH CARE EDUCATION/TRAINING PROGRAM

## 2025-03-19 PROCEDURE — 76820 UMBILICAL ARTERY ECHO: CPT

## 2025-03-19 NOTE — NURSING NOTE
Mei Tello is a  at 23w2d who presents to [facility] for scheduled follow up on anatomy. Pt reports positive fetal movement. Pt denies bldg/lof/change in discharge, contractions, headache, vision changes, chest pain/SOB or edema. SBAR given to Dr. Ayoub, see note in Epic.

## 2025-03-19 NOTE — PROGRESS NOTES
The patient was seen for an [facility] in [facility] clinic today.  For a detailed report of the ultrasound examination, please see the ultrasound report which can be found under the imaging tab.    If you have questions regarding today's evaluation or if we can be of further service, please contact the [facility].    Wayne Ayoub M.D.  Maternal Fetal-Medicine Specialist

## 2025-03-24 ENCOUNTER — TELEPHONE (OUTPATIENT)
Dept: CARDIOLOGY | Facility: CLINIC | Age: 40
End: 2025-03-24
Payer: COMMERCIAL

## 2025-03-24 NOTE — TELEPHONE ENCOUNTER
"Pt called and LVM to reschedule echo fetal. I returned call and patient would like to take some time to \"take in the information she was given\" before having an echo fetal.   "

## 2025-04-03 ENCOUNTER — VIRTUAL VISIT (OUTPATIENT)
Dept: ENDOCRINOLOGY | Facility: CLINIC | Age: 40
End: 2025-04-03
Payer: COMMERCIAL

## 2025-04-03 DIAGNOSIS — O24.414 INSULIN CONTROLLED GESTATIONAL DIABETES MELLITUS (GDM) IN SECOND TRIMESTER: Primary | ICD-10-CM

## 2025-04-03 RX ORDER — HUMAN INSULIN 100 [IU]/ML
INJECTION, SUSPENSION SUBCUTANEOUS
Qty: 30 ML | Refills: 3 | Status: SHIPPED | OUTPATIENT
Start: 2025-04-03

## 2025-04-03 NOTE — PROGRESS NOTES
"[facility]  ENDOCRINOLOGY    Gestational Diabetes 4/3/2025    Mei Tello, 1985, [id number]          Reason for visit      No diagnosis found.    HPI     Mei Tello is a very pleasant 40 year old old female who presents for GESTATIONAL Diabetes Mellitus.  She is currently***  weeks pregnant GP. Due date is*** 2015  Diagnosed with GDM based on an OGTT. She has{Blank single:85356::\"had\",\"not had\"}  GDM in prior pregnancies.   Current carbohydrate intake:consistent with recommendations of 30g-60g-60g.  I have reviewed her blood glucose logs and note that the:  Fasting readings  are:{Blank single:19269::\"in range\",\"above range\"} on current regimen  Postprandial readings are:{Blank single:40194::\"in range\",\"above range\"} on current regimen  Current NPH dose:  Current Prandial insulin:   Blood glucose logs/meter brought in and data reviewed and incorporated into decision-making.  [facility]     Therapy/Interventions in the past:  She has been seen by the Diabetes Educator- and has received instruction on carbohydrate counting and  consistency.  Records from referring provider and other sources have also been reviewed and incorporated into decision-making.      TODAY:    Blood Glucose Numbers:    3/28  Fasting  89  1hour after:  B  121  L  96  D  x    3/29  Fasting  98  1 hour after:  B  149  L  119  D  140    3/30  Fasting  84  1 hour after:  B  127  L  103  D  108    3/31  Fasting  95   1 hour after:  B  122  L  111  D  121    4/1  Fasting  108  1 hour after:  B  136  L  99  D  122    4/2  Fasting  90  1 hour after:  B  106  L  142  D  109    4/3  Fasting 92  1 hour after:  B  123      Past Medical History       Patient Active Problem List   Diagnosis    Cervical high risk HPV (human papillomavirus) test positive    History of melanoma    Morbid obesity (H)    Impaired fasting glucose    Abnormal glucose level    Gestational diabetes mellitus    Human papilloma virus infection " "   Obstruction of fallopian tube        Past Surgical History     Past Surgical History:   Procedure Laterality Date    CHOLECYSTECTOMY      ZZC LAP,CHOLECYSTECTOMY/EXPLORE      Description: Cholecystectomy Laparoscopic;  Recorded: 10/30/2008;       Family History     Family History   Problem Relation Age of Onset    Hypertension Mother     Heart Disease Father     Bipolar Disorder Sister     Bipolar Disorder Sister     Breast Cancer Maternal Aunt 58.00       Social History     Social History     Tobacco Use    Smoking status: Never    Smokeless tobacco: Never   Vaping Use    Vaping status: Never Used       Review of Systems     Patient has {:180210}  Remainder negative except as noted in HPI.      Vital Signs     LMP 10/07/2024   Wt Readings from Last 3 Encounters:   04/25/24 118.2 kg (260 lb 9.6 oz)   05/18/23 116.6 kg (257 lb)   05/16/22 114.7 kg (252 lb 12.8 oz)       Physical Exam     Constitutional:  Well developed, Well nourished  HENT:  Normocephalic,   Neck: normal in appearance  Eyes:  PERRL, Conjunctiva pink  Respiratory:  No respiratory distress  Skin: No acanthosis nigricans, lipoatrophy or lipodystrophy  Neurologic:  Alert & oriented x 3, nonfocal  Psychiatric:  Affect, Mood, Insight appropriate    Assessment     No diagnosis found.    Plan             Lizzeth Schaefer NP  4/3/2025    Lab Results     No results found for: \"HGBA1C\", \"CREATININE\", \"MICROALBUR\"    Cholesterol   Date Value Ref Range Status   04/25/2024 204 (H) <200 mg/dL Final     Direct Measure HDL   Date Value Ref Range Status   04/25/2024 49 (L) >=50 mg/dL Final     Triglycerides   Date Value Ref Range Status   04/25/2024 173 (H) <150 mg/dL Final       [unfilled]      Current Medications           Reason for visit: GDM                       ,  "

## 2025-04-09 ENCOUNTER — OFFICE VISIT (OUTPATIENT)
Dept: MATERNAL FETAL MEDICINE | Facility: HOSPITAL | Age: 40
End: 2025-04-09
Attending: OBSTETRICS & GYNECOLOGY
Payer: COMMERCIAL

## 2025-04-09 ENCOUNTER — ANCILLARY PROCEDURE (OUTPATIENT)
Dept: ULTRASOUND IMAGING | Facility: HOSPITAL | Age: 40
End: 2025-04-09
Attending: OBSTETRICS & GYNECOLOGY
Payer: COMMERCIAL

## 2025-04-09 DIAGNOSIS — O36.5990 FETAL GROWTH RESTRICTION ANTEPARTUM: ICD-10-CM

## 2025-04-09 PROCEDURE — 76816 OB US FOLLOW-UP PER FETUS: CPT | Mod: 26 | Performed by: OBSTETRICS & GYNECOLOGY

## 2025-04-09 PROCEDURE — 99213 OFFICE O/P EST LOW 20 MIN: CPT | Mod: 25 | Performed by: OBSTETRICS & GYNECOLOGY

## 2025-04-09 PROCEDURE — 76816 OB US FOLLOW-UP PER FETUS: CPT

## 2025-04-09 NOTE — PROGRESS NOTES
Please see the imaging tab for details of the ultrasound performed today.    Abby Rivera MD  Specialist in Maternal-Fetal Medicine

## 2025-04-09 NOTE — NURSING NOTE
Mei Tello is a  at 26w2d who presents to [facility] for a follow-up growth ultrasound for FGR. Pt reports positive fetal movement. Pt denies bldg/lof/change in discharge, contractions, headache, vision changes, chest pain/SOB or edema. SBAR given to Dr. Tobias, see note in Epic.      Raiza Perry RN

## 2025-05-31 ENCOUNTER — HEALTH MAINTENANCE LETTER (OUTPATIENT)
Age: 40
End: 2025-05-31

## 2025-06-13 ENCOUNTER — LAB REQUISITION (OUTPATIENT)
Dept: LAB | Facility: CLINIC | Age: 40
End: 2025-06-13
Payer: COMMERCIAL

## 2025-06-13 DIAGNOSIS — Z3A.35 35 WEEKS GESTATION OF PREGNANCY: ICD-10-CM

## 2025-06-13 PROCEDURE — 87653 STREP B DNA AMP PROBE: CPT | Mod: ORL | Performed by: STUDENT IN AN ORGANIZED HEALTH CARE EDUCATION/TRAINING PROGRAM

## 2025-06-14 LAB — GP B STREP DNA SPEC QL NAA+PROBE: POSITIVE

## 2025-06-26 ENCOUNTER — VIRTUAL VISIT (OUTPATIENT)
Dept: ENDOCRINOLOGY | Facility: CLINIC | Age: 40
End: 2025-06-26
Payer: COMMERCIAL

## 2025-06-26 DIAGNOSIS — O24.414 INSULIN CONTROLLED GESTATIONAL DIABETES MELLITUS (GDM) IN THIRD TRIMESTER: Primary | ICD-10-CM

## 2025-06-26 NOTE — Clinical Note
"6/26/2025      Mei Tello  [address] [address] [facility] [place] [address]      Dear Colleague,    Thank you for referring your patient, Mei Tello, to the [facility]. Please see a copy of my visit note below.                                                    Reason for visit: GDM    Blood Glucose Numbers:    6/20  Fasting  91  1 hour after:  B  123  L  120  D  135    6/21  Fasting  88  1 hour after:  B  118  L  118  D  133    6/22  Fasting  93  1 hour after:  B  120  L  118  D  132    6/23  Fasting  86  1 hour after:  B  110  L  127  D  156    6/24  Fasting  91  1 hour after:  B  112  L  101  D  111    6/25  Fasting  88  1 hour after:  B  122  L  112  D  137    6/26  Fasting 86  1 hour after:  B  121  L  144  D  x    Virtual Visit Details    Type of service:  Video Visit     Originating Location (pt. Location): {video visit patient location:391984::\"Home\"}  {PROVIDER LOCATION On-site should be selected for visits conducted from your clinic location or adjoining [facility] hospital, academic office, or other nearby [facility] building. Off-site should be selected for all other provider locations, including home:430021}  Distant Location (provider location):  {virtual location provider:626823}  Platform used for Video Visit: {Virtual Visit Platforms:278265::\"The Online 401\"}                           Again, thank you for allowing me to participate in the care of your patient.        Sincerely,        Lizzeth Schaefer NP    Electronically signed"

## 2025-06-26 NOTE — PROGRESS NOTES
[facility]  ENDOCRINOLOGY    Gestational Diabetes 2025    Mei Tello, 1985, [id number]          Reason for visit      1. Insulin controlled gestational diabetes mellitus (GDM) in third trimester        HPI     Mei Tello is a very pleasant 39 year old old female who presents for GESTATIONAL Diabetes Mellitus.  She is currently 37w3d   pregnant . Due date is 25   Diagnosed with GDM based on an OGTT. She hasnot had  GDM in prior pregnancies.   Current carbohydrate intake:consistent with recommendations of 30g-60g-60g.  I have reviewed her blood glucose logs and note that the:  Fasting readings  are:in range on current regimen  Postprandial readings are:in range on current regimen  Current NPH dose: 50  Current Prandial insulin:   Blood glucose logs/meter brought in and data reviewed and incorporated into decision-making.  Planned delivery at:   OB[place]: [place]      Therapy/Interventions in the past:  She has been seen by the Diabetes Educator- and has received instruction on carbohydrate counting and  consistency.  Records from referring provider and other sources have also been reviewed and incorporated into decision-making.      TODAY:    Mei is seen today after a hiatus for her last appointment for GDM. We are joined by CANDICE Gordillo. Pt supplies BG and they look great. She will likely not be induced until 39 weeks if she doesn't spontaneously go into Labor before. Baby is moving appropriately and she is having no swelling. Postpartum instructions given and all questions answered to her satisfaction. She will call prior to Delivery with problems or concerns.             Blood Glucose Numbers:      Fasting  91  1 hour after:  B  123  L  120  D  135      Fasting  88  1 hour after:  B  118  L  118  D  133      Fasting  93  1 hour after:  B  120  L  118  D  132      Fasting  86  1 hour after:  B  110  L  127  D  156      Fasting  91  1 hour after:  B   112  L  101  D  111    6/25  Fasting  88  1 hour after:  B  122  L  112  D  137    6/26  Fasting 86  1 hour after:  B  121  L  144  D  x    Past Medical History       Patient Active Problem List   Diagnosis    Cervical high risk HPV (human papillomavirus) test positive    History of melanoma    Morbid obesity (H)    Impaired fasting glucose    Abnormal glucose level    Gestational diabetes mellitus    Human papilloma virus infection    Obstruction of fallopian tube        Past Surgical History     Past Surgical History:   Procedure Laterality Date    CHOLECYSTECTOMY      ZZC LAP,CHOLECYSTECTOMY/EXPLORE      Description: Cholecystectomy Laparoscopic;  Recorded: 10/30/2008;       Family History     Family History   Problem Relation Age of Onset    Hypertension Mother     Heart Disease Father     Bipolar Disorder Sister     Bipolar Disorder Sister     Breast Cancer Maternal Aunt 58.00       Social History     Social History     Tobacco Use    Smoking status: Never    Smokeless tobacco: Never   Vaping Use    Vaping status: Never Used       Review of Systems     Patient has no polyuria or polydipsia, no chest pain, dyspnea or TIA's, no numbness, tingling or pain in extremities  Remainder negative except as noted in HPI.      Vital Signs     LMP 10/07/2024   Wt Readings from Last 3 Encounters:   04/25/24 118.2 kg (260 lb 9.6 oz)   05/18/23 116.6 kg (257 lb)   05/16/22 114.7 kg (252 lb 12.8 oz)       Physical Exam     Constitutional:  Well developed, Well nourished  HENT:  Normocephalic,   Neck: normal in appearance  Eyes:  PERRL, Conjunctiva pink  Respiratory:  No respiratory distress  Skin: No acanthosis nigricans, lipoatrophy or lipodystrophy  Neurologic:  Alert & oriented x 3, nonfocal  Psychiatric:  Affect, Mood, Insight appropriate    Assessment     1. Insulin controlled gestational diabetes mellitus (GDM) in third trimester        Plan     1. GESTATIONAL DIABETES-  Adjust dose as follows:     -NPH insulin 50 units.  Increase by 2 units every 2 days to keep fasting blood glucose below 95mg/dL  -Novolog 0  units with breakfast  -Novolog 0 units with lunch   -Novolog 0 units with dinner  -Increase by 0 units every 2 days to keep 1 hour after meal blood glucose less than 140mg/dL     We reviewed glucose goals of fasting blood glucose <95 mg/dL and 1 hour post prandial blood glucose of <140 mg/dL.    Monitor blood sugar 4 times daily: Fasting  and 1 hour after each meal.  Contact  this clinic [phone] if blood glucose is not within the above-mentioned goals.      We discussed the importance of excellent glycemic control during pregnancy to limit complications such as fetal macrosomia, shoulder dystocia,  hypoglycemia and hyperbilirubinemia.  I have discussed the patient's increased risk of recurrent GDM and/or development of type 2 diabetes later in life.       F/up with A1c 3 months postpartum with PCP.  May be a candidate for metformin postpartum as she has more than 1 risk factor for future Type 2 Diabetes Mellitus.  She will need a screening A1c at least annually, TLC- including carbohydrate control and exercise.    After you deliver the baby, it is suggested to check your blood sugar occasionally (1 - 2 times per week) for 6 weeks.   When you are not pregnant, normal blood sugars are:     Fasting (before eating anything in the morning)  - under 100 mg/dl  2 hours after meals under 140  The American Diabetes Association recommends:   a glucose tolerance test 6 weeks after you deliver the baby.       a hemoglobin Alc test yearly after delivery.  The Alc test is a 2-3 month average blood sugar reading.        Discuss getting these tests with your provider.     After delivery, and your provider has said that it is safe to be active, work toward getting 150 minutes each week of physical activity to decrease your risk of  getting type 2 diabetes.     Maintaining a healthy body weight will also decrease your risk of  "getting type 2 diabetes.           Lizzeth Schaefer NP  6/26/2025      Lab Results     No results found for: \"HGBA1C\", \"CREATININE\", \"MICROALBUR\"    Cholesterol   Date Value Ref Range Status   04/25/2024 204 (H) <200 mg/dL Final     Direct Measure HDL   Date Value Ref Range Status   04/25/2024 49 (L) >=50 mg/dL Final     Triglycerides   Date Value Ref Range Status   04/25/2024 173 (H) <150 mg/dL Final       [unfilled]      Current Medications     Visit start time: 1400    Visit stop time: 1420    Virtual Visit Details    Type of service:  Video Visit     Originating Location (pt. Location): Home    Distant Location (provider location):  On-site  Platform used for Video Visit: Agus"

## 2025-07-10 ENCOUNTER — HOSPITAL ENCOUNTER (INPATIENT)
Dept: OBGYN | Facility: HOSPITAL | Age: 40
End: 2025-07-10
Attending: OBSTETRICS & GYNECOLOGY | Admitting: OBSTETRICS & GYNECOLOGY
Payer: COMMERCIAL

## 2025-07-10 VITALS
SYSTOLIC BLOOD PRESSURE: 135 MMHG | DIASTOLIC BLOOD PRESSURE: 69 MMHG | WEIGHT: 267 LBS | RESPIRATION RATE: 18 BRPM | HEIGHT: 63 IN | TEMPERATURE: 98.6 F | BODY MASS INDEX: 47.31 KG/M2

## 2025-07-10 PROBLEM — Z34.90 PREGNANCY: Status: ACTIVE | Noted: 2025-07-10

## 2025-07-10 LAB
ABO + RH BLD: NORMAL
BLD GP AB SCN SERPL QL: NEGATIVE
ERYTHROCYTE [DISTWIDTH] IN BLOOD BY AUTOMATED COUNT: 13 % (ref 10–15)
EST. AVERAGE GLUCOSE BLD GHB EST-MCNC: 108 MG/DL
GLUCOSE BLDC GLUCOMTR-MCNC: 122 MG/DL (ref 70–99)
GLUCOSE BLDC GLUCOMTR-MCNC: 132 MG/DL (ref 70–99)
GLUCOSE BLDC GLUCOMTR-MCNC: 76 MG/DL (ref 70–99)
HBA1C MFR BLD: 5.4 %
HCT VFR BLD AUTO: 35 % (ref 35–47)
HGB BLD-MCNC: 12 G/DL (ref 11.7–15.7)
MCH RBC QN AUTO: 28.9 PG (ref 26.5–33)
MCHC RBC AUTO-ENTMCNC: 34.3 G/DL (ref 31.5–36.5)
MCV RBC AUTO: 84 FL (ref 78–100)
PLATELET # BLD AUTO: 340 10E3/UL (ref 150–450)
RBC # BLD AUTO: 4.15 10E6/UL (ref 3.8–5.2)
SPECIMEN EXP DATE BLD: NORMAL
T PALLIDUM AB SER QL: NONREACTIVE
WBC # BLD AUTO: 18.3 10E3/UL (ref 4–11)

## 2025-07-10 PROCEDURE — 83036 HEMOGLOBIN GLYCOSYLATED A1C: CPT | Performed by: OBSTETRICS & GYNECOLOGY

## 2025-07-10 PROCEDURE — 86901 BLOOD TYPING SEROLOGIC RH(D): CPT | Performed by: OBSTETRICS & GYNECOLOGY

## 2025-07-10 PROCEDURE — 36415 COLL VENOUS BLD VENIPUNCTURE: CPT | Performed by: OBSTETRICS & GYNECOLOGY

## 2025-07-10 PROCEDURE — 85041 AUTOMATED RBC COUNT: CPT | Performed by: OBSTETRICS & GYNECOLOGY

## 2025-07-10 PROCEDURE — 250N000013 HC RX MED GY IP 250 OP 250 PS 637: Performed by: OBSTETRICS & GYNECOLOGY

## 2025-07-10 PROCEDURE — 120N000001 HC R&B MED SURG/OB

## 2025-07-10 PROCEDURE — 86780 TREPONEMA PALLIDUM: CPT | Performed by: OBSTETRICS & GYNECOLOGY

## 2025-07-10 PROCEDURE — 250N000012 HC RX MED GY IP 250 OP 636 PS 637: Performed by: OBSTETRICS & GYNECOLOGY

## 2025-07-10 RX ORDER — DEXTROSE MONOHYDRATE 25 G/50ML
25-50 INJECTION, SOLUTION INTRAVENOUS
Status: ACTIVE | OUTPATIENT
Start: 2025-07-10

## 2025-07-10 RX ORDER — MISOPROSTOL 200 UG/1
800 TABLET ORAL
Status: ACTIVE | OUTPATIENT
Start: 2025-07-10

## 2025-07-10 RX ORDER — NICOTINE POLACRILEX 4 MG
15-30 LOZENGE BUCCAL
Status: ACTIVE | OUTPATIENT
Start: 2025-07-10

## 2025-07-10 RX ORDER — OXYTOCIN 10 [USP'U]/ML
10 INJECTION, SOLUTION INTRAMUSCULAR; INTRAVENOUS
Status: ACTIVE | OUTPATIENT
Start: 2025-07-10

## 2025-07-10 RX ORDER — HYDROXYZINE HYDROCHLORIDE 50 MG/1
50 TABLET, FILM COATED ORAL
Status: ACTIVE | OUTPATIENT
Start: 2025-07-10

## 2025-07-10 RX ORDER — KETOROLAC TROMETHAMINE 15 MG/ML
15 INJECTION, SOLUTION INTRAMUSCULAR; INTRAVENOUS
Status: ACTIVE | OUTPATIENT
Start: 2025-07-10

## 2025-07-10 RX ORDER — NALOXONE HYDROCHLORIDE 0.4 MG/ML
0.4 INJECTION, SOLUTION INTRAMUSCULAR; INTRAVENOUS; SUBCUTANEOUS
Status: ACTIVE | OUTPATIENT
Start: 2025-07-10

## 2025-07-10 RX ORDER — PENICILLIN G 3000000 [IU]/50ML
3 INJECTION, SOLUTION INTRAVENOUS EVERY 4 HOURS
Status: ACTIVE | OUTPATIENT
Start: 2025-07-10

## 2025-07-10 RX ORDER — LOPERAMIDE HYDROCHLORIDE 2 MG/1
2 CAPSULE ORAL
Status: ACTIVE | OUTPATIENT
Start: 2025-07-10

## 2025-07-10 RX ORDER — METOCLOPRAMIDE 10 MG/1
10 TABLET ORAL EVERY 6 HOURS PRN
Status: ACTIVE | OUTPATIENT
Start: 2025-07-10

## 2025-07-10 RX ORDER — LIDOCAINE 40 MG/G
CREAM TOPICAL
Status: ACTIVE | OUTPATIENT
Start: 2025-07-10

## 2025-07-10 RX ORDER — METOCLOPRAMIDE HYDROCHLORIDE 5 MG/ML
10 INJECTION INTRAMUSCULAR; INTRAVENOUS EVERY 6 HOURS PRN
Status: ACTIVE | OUTPATIENT
Start: 2025-07-10

## 2025-07-10 RX ORDER — MISOPROSTOL 200 UG/1
400 TABLET ORAL
Status: ACTIVE | OUTPATIENT
Start: 2025-07-10

## 2025-07-10 RX ORDER — PENICILLIN G POTASSIUM 5000000 [IU]/1
5 INJECTION, POWDER, FOR SOLUTION INTRAMUSCULAR; INTRAVENOUS ONCE
Status: ACTIVE | OUTPATIENT
Start: 2025-07-10

## 2025-07-10 RX ORDER — TRANEXAMIC ACID 10 MG/ML
1 INJECTION, SOLUTION INTRAVENOUS EVERY 30 MIN PRN
Status: ACTIVE | OUTPATIENT
Start: 2025-07-10

## 2025-07-10 RX ORDER — ONDANSETRON 2 MG/ML
4 INJECTION INTRAMUSCULAR; INTRAVENOUS EVERY 6 HOURS PRN
Status: ACTIVE | OUTPATIENT
Start: 2025-07-10

## 2025-07-10 RX ORDER — MISOPROSTOL 100 UG/1
25 TABLET ORAL
Status: DISPENSED | OUTPATIENT
Start: 2025-07-10 | End: 2025-07-11

## 2025-07-10 RX ORDER — OXYTOCIN/0.9 % SODIUM CHLORIDE 30/500 ML
100-340 PLASTIC BAG, INJECTION (ML) INTRAVENOUS CONTINUOUS PRN
Status: ACTIVE | OUTPATIENT
Start: 2025-07-10

## 2025-07-10 RX ORDER — NALOXONE HYDROCHLORIDE 0.4 MG/ML
0.2 INJECTION, SOLUTION INTRAMUSCULAR; INTRAVENOUS; SUBCUTANEOUS
Status: ACTIVE | OUTPATIENT
Start: 2025-07-10

## 2025-07-10 RX ORDER — FENTANYL CITRATE 50 UG/ML
100 INJECTION, SOLUTION INTRAMUSCULAR; INTRAVENOUS
Refills: 0 | Status: ACTIVE | OUTPATIENT
Start: 2025-07-10

## 2025-07-10 RX ORDER — PROCHLORPERAZINE MALEATE 10 MG
10 TABLET ORAL EVERY 6 HOURS PRN
Status: ACTIVE | OUTPATIENT
Start: 2025-07-10

## 2025-07-10 RX ORDER — LOPERAMIDE HYDROCHLORIDE 2 MG/1
4 CAPSULE ORAL
Status: ACTIVE | OUTPATIENT
Start: 2025-07-10

## 2025-07-10 RX ORDER — ONDANSETRON 4 MG/1
4 TABLET, ORALLY DISINTEGRATING ORAL EVERY 6 HOURS PRN
Status: ACTIVE | OUTPATIENT
Start: 2025-07-10

## 2025-07-10 RX ORDER — CITRIC ACID/SODIUM CITRATE 334-500MG
30 SOLUTION, ORAL ORAL
Status: ACTIVE | OUTPATIENT
Start: 2025-07-10

## 2025-07-10 RX ORDER — MORPHINE SULFATE 10 MG/ML
10 INJECTION, SOLUTION INTRAMUSCULAR; INTRAVENOUS
Refills: 0 | Status: ACTIVE | OUTPATIENT
Start: 2025-07-10

## 2025-07-10 RX ORDER — METHYLERGONOVINE MALEATE 0.2 MG/ML
200 INJECTION INTRAVENOUS
Status: ACTIVE | OUTPATIENT
Start: 2025-07-10

## 2025-07-10 RX ORDER — TERBUTALINE SULFATE 1 MG/ML
0.25 INJECTION SUBCUTANEOUS
Status: ACTIVE | OUTPATIENT
Start: 2025-07-10

## 2025-07-10 RX ORDER — CARBOPROST TROMETHAMINE 250 UG/ML
250 INJECTION, SOLUTION INTRAMUSCULAR
Status: ACTIVE | OUTPATIENT
Start: 2025-07-10

## 2025-07-10 RX ORDER — OXYTOCIN/0.9 % SODIUM CHLORIDE 30/500 ML
340 PLASTIC BAG, INJECTION (ML) INTRAVENOUS CONTINUOUS PRN
Status: ACTIVE | OUTPATIENT
Start: 2025-07-10

## 2025-07-10 RX ORDER — IBUPROFEN 800 MG/1
800 TABLET, FILM COATED ORAL
Status: ACTIVE | OUTPATIENT
Start: 2025-07-10

## 2025-07-10 RX ORDER — ACETAMINOPHEN 325 MG/1
650 TABLET ORAL EVERY 4 HOURS PRN
Status: ACTIVE | OUTPATIENT
Start: 2025-07-10

## 2025-07-10 RX ADMIN — MISOPROSTOL 25 MCG: 100 TABLET ORAL at 16:09

## 2025-07-10 RX ADMIN — MISOPROSTOL 25 MCG: 100 TABLET ORAL at 18:13

## 2025-07-10 RX ADMIN — INSULIN HUMAN 56 UNITS: 100 INJECTION, SUSPENSION SUBCUTANEOUS at 22:27

## 2025-07-10 RX ADMIN — MISOPROSTOL 25 MCG: 100 TABLET ORAL at 22:30

## 2025-07-10 RX ADMIN — MISOPROSTOL 25 MCG: 100 TABLET ORAL at 10:01

## 2025-07-10 RX ADMIN — MISOPROSTOL 25 MCG: 100 TABLET ORAL at 14:17

## 2025-07-10 RX ADMIN — MISOPROSTOL 25 MCG: 100 TABLET ORAL at 20:15

## 2025-07-10 RX ADMIN — MISOPROSTOL 25 MCG: 100 TABLET ORAL at 12:05

## 2025-07-10 ASSESSMENT — ACTIVITIES OF DAILY LIVING (ADL)
ADLS_ACUITY_SCORE: 22
ADLS_ACUITY_SCORE: 45
ADLS_ACUITY_SCORE: 22

## 2025-07-10 NOTE — PLAN OF CARE
Problem: Adult Inpatient Plan of Care  Goal: Plan of Care Review  Description: The Plan of Care Review/Shift note should be completed every shift.  The Outcome Evaluation is a brief statement about your assessment that the patient is improving, declining, or no change.  This information will be displayed automatically on your shift  note.  Outcome: Progressing  Flowsheets (Taken 7/10/2025 5769)  Outcome Evaluation: pt here for IOL for GDM.  Plan:  cervical ripening.  Plan of Care Reviewed With: patient  Overall Patient Progress: improving     Problem: Labor  Goal: Stable Fetal Wellbeing  Outcome: Progressing  Goal: Effective Progression to Delivery  Outcome: Progressing  Goal: Acceptable Pain Control  Outcome: Progressing  Goal: Normal Uterine Contraction Pattern  Outcome: Progressing   Goal Outcome Evaluation:      Plan of Care Reviewed With: patient    Overall Patient Progress: improvingOverall Patient Progress: improving    Outcome Evaluation: pt here for IOL for GDM.  Plan:  cervical ripening.

## 2025-07-10 NOTE — H&P
"[facility] Labor and Delivery History and Physical    Mei Tello MRN# [id number]   Age: 40 year old YOB: 1985     Date of Admission:  7/10/2025    Primary care provider: Sade Krueger           Chief Complaint:   Mei Tello is a 40 year old female who is 39w3d pregnant and being admitted for induction of labor, indication gestational diabetes.          Pregnancy history:     OBSTETRIC HISTORY:    OB History    Para Term  AB Living   1 0 0 0 0 0   SAB IAB Ectopic Multiple Live Births   0 0 0 0 0      # Outcome Date GA Lbr Ricardo/2nd Weight Sex Type Anes PTL Lv   1 Current                EDC: Estimated Date of Delivery: 25    Prenatal Labs:   Lab Results   Component Value Date    AS Negative 2024    HEPBANG Nonreactive 2024    CHPCRT  2015     Negative   Negative for C. trachomatis rRNA by transcription mediated amplification.   A negative result by transcription mediated amplification does not preclude the   presence of C. trachomatis infection because results are dependent on proper   and adequate collection, absence of inhibitors, and sufficient rRNA to be   detected.      GCPCRT  2015     Negative   Negative for N. gonorrhoeae rRNA by transcription mediated amplification.   A negative result by transcription mediated amplification does not preclude the   presence of N. gonorrhoeae infection because results are dependent on proper   and adequate collection, absence of inhibitors, and sufficient rRNA to be   detected.      HGB 12.0 2025       GBS Status:   No results found for: \"GBS\"    Active Problem List  Patient Active Problem List   Diagnosis    Cervical high risk HPV (human papillomavirus) test positive    History of melanoma    Morbid obesity (H)    Impaired fasting glucose    Abnormal glucose level    Gestational diabetes mellitus    Human papilloma virus infection    Obstruction of fallopian tube    " Pregnancy       Medication Prior to Admission  Medications Prior to Admission   Medication Sig Dispense Refill Last Dose/Taking    insulin NPH (NOVOLIN N FLEXPEN) 100 UNIT/ML injection Take as directed at HS, may titrate up to 80 units daily (Patient taking differently: 56 Units. Take as directed at HS, may titrate up to 80 units daily) 30 mL 3 7/9/2025    ACCU-CHEK GUIDE TEST test strip USE TO CHECK SUGARS FOUR TIMES DAILY AS DIRECTED       Alcohol Swabs PADS 1 each daily. 100 each 4     blood glucose monitoring (SOFTCLIX) lancets USE TO CHECK SUGARS FOUR TIMES DAILY AS DIRECTED       Blood Glucose Monitoring Suppl (ACCU-CHEK GUIDE ME) w/Device KIT USE TO CHECK SUGARS FOUR TIMES DAILY       Insulin Pen Needle (PEN NEEDLES) 32G X 4 MM MISC 1 each daily. 100 each 1     magnesium 250 MG tablet Take 1 tablet by mouth daily       Prenatal Vit-Fe Fumarate-FA (PRENATAL MULTIVITAMIN  PLUS IRON) 27-1 MG TABS Take by mouth daily      .        Maternal Past Medical History:     Past Medical History:   Diagnosis Date    Cervical high risk HPV (human papillomavirus) test positive 4/4/2018 2009, 2011, 2014 NIL paps 4/4/18 NIL pap, +HR HPV (not 16/18) 5/30/19 NIL pap, +HR HPV (not 16/18) 6/14/21 NIL pap, +HR HPV (not 16/18). Plan: colposcopy due before 9/14/21 / await provider    Migraine headache     Pneumonia due to 2019 novel coronavirus 11/11/2020                       Family History:   This patient has no significant family history            Social History:   This patient has no significant social history         Review of Systems:   CONSTITUTIONAL: NEGATIVE for fever, chills, change in weight  ENT/MOUTH: NEGATIVE for ear, mouth and throat problems  RESP: NEGATIVE for significant cough or SOB  CV: NEGATIVE for chest pain, palpitations or peripheral edema          Physical Exam:   Vitals were reviewed  Temp: 98.5  F (36.9  C) Temp src: Oral       Resp: 20        Lungs:   No increased work of breathing, good air exchange,  clear to auscultation bilaterally, no crackles or wheezing     Cardiovascular:   regular rate and rhythm     Abdomen:   No scars, normal bowel sounds, soft, non-distended, non-tender, no masses palpated, no hepatosplenomegally      Cervix:   Membranes: intact   Dilation: 1   Effacement: 30%   Station:-3   Consistency: average   Position: Mid  Presentation:Vertex by USN  Fetal Heart Rate Tracins, Cat 1  Tocometer: inadequate                       Assessment:   Mei Tello is a 39w3d pregnant female admitted with induction of labor, indication Gestational diabetes.          Plan:   cervical ripening with misoprostol  Insulin at bedtime. Will switch to IV orders when in labor    Rolando Elliott MD

## 2025-07-11 NOTE — PROVIDER NOTIFICATION
07/10/25 1609 07/10/25 2222   Vital Signs   BP (!) 143/63 (!) 140/69     Dr. Elliott updated at 2235 regarding above blood pressure readings. Plan to obtain pre-E labs if blood pressure is greater than 140/90.

## 2025-07-12 ENCOUNTER — ANESTHESIA EVENT (OUTPATIENT)
Dept: OBGYN | Facility: HOSPITAL | Age: 40
End: 2025-07-12
Payer: COMMERCIAL

## 2025-07-12 ENCOUNTER — ANESTHESIA (OUTPATIENT)
Dept: OBGYN | Facility: HOSPITAL | Age: 40
End: 2025-07-12
Payer: COMMERCIAL

## 2025-07-12 LAB
GLUCOSE BLDC GLUCOMTR-MCNC: 113 MG/DL (ref 70–99)
GLUCOSE BLDC GLUCOMTR-MCNC: 144 MG/DL (ref 70–99)
GLUCOSE BLDC GLUCOMTR-MCNC: 78 MG/DL (ref 70–99)
GLUCOSE BLDC GLUCOMTR-MCNC: 86 MG/DL (ref 70–99)

## 2025-07-12 PROCEDURE — 250N000011 HC RX IP 250 OP 636: Performed by: PAIN MEDICINE

## 2025-07-12 RX ADMIN — LIDOCAINE HYDROCHLORIDE 5 ML: 20 INJECTION, SOLUTION EPIDURAL; INFILTRATION; INTRACAUDAL; PERINEURAL at 20:24

## 2025-07-13 LAB
ABO + RH BLD: NORMAL
ALBUMIN SERPL BCG-MCNC: 3.3 G/DL (ref 3.5–5.2)
ALP SERPL-CCNC: 122 U/L (ref 40–150)
ALT SERPL W P-5'-P-CCNC: 7 U/L (ref 0–50)
ANION GAP SERPL CALCULATED.3IONS-SCNC: 9 MMOL/L (ref 7–15)
AST SERPL W P-5'-P-CCNC: 12 U/L (ref 0–45)
BILIRUB SERPL-MCNC: 0.3 MG/DL
BLD GP AB SCN SERPL QL: NEGATIVE
BUN SERPL-MCNC: 7.5 MG/DL (ref 6–20)
CALCIUM SERPL-MCNC: 9.3 MG/DL (ref 8.8–10.4)
CHLORIDE SERPL-SCNC: 108 MMOL/L (ref 98–107)
CREAT SERPL-MCNC: 0.53 MG/DL (ref 0.51–0.95)
EGFRCR SERPLBLD CKD-EPI 2021: >90 ML/MIN/1.73M2
ERYTHROCYTE [DISTWIDTH] IN BLOOD BY AUTOMATED COUNT: 12.9 % (ref 10–15)
GLUCOSE BLDC GLUCOMTR-MCNC: 106 MG/DL (ref 70–99)
GLUCOSE BLDC GLUCOMTR-MCNC: 63 MG/DL (ref 70–99)
GLUCOSE BLDC GLUCOMTR-MCNC: 91 MG/DL (ref 70–99)
GLUCOSE BLDC GLUCOMTR-MCNC: 93 MG/DL (ref 70–99)
GLUCOSE SERPL-MCNC: 90 MG/DL (ref 70–99)
HCO3 SERPL-SCNC: 22 MMOL/L (ref 22–29)
HCT VFR BLD AUTO: 34.3 % (ref 35–47)
HGB BLD-MCNC: 11.7 G/DL (ref 11.7–15.7)
MCH RBC QN AUTO: 28.7 PG (ref 26.5–33)
MCHC RBC AUTO-ENTMCNC: 34.1 G/DL (ref 31.5–36.5)
MCV RBC AUTO: 84 FL (ref 78–100)
PLATELET # BLD AUTO: 316 10E3/UL (ref 150–450)
POTASSIUM SERPL-SCNC: 3.9 MMOL/L (ref 3.4–5.3)
PROT SERPL-MCNC: 6.1 G/DL (ref 6.4–8.3)
RBC # BLD AUTO: 4.07 10E6/UL (ref 3.8–5.2)
SODIUM SERPL-SCNC: 139 MMOL/L (ref 135–145)
SPECIMEN EXP DATE BLD: NORMAL
WBC # BLD AUTO: 24.2 10E3/UL (ref 4–11)

## 2025-07-13 PROCEDURE — 250N000011 HC RX IP 250 OP 636: Performed by: OBSTETRICS & GYNECOLOGY

## 2025-07-13 PROCEDURE — 258N000003 HC RX IP 258 OP 636: Performed by: ANESTHESIOLOGY

## 2025-07-13 PROCEDURE — 59514 CESAREAN DELIVERY ONLY: CPT | Performed by: PAIN MEDICINE

## 2025-07-13 PROCEDURE — 250N000011 HC RX IP 250 OP 636: Performed by: ANESTHESIOLOGY

## 2025-07-13 PROCEDURE — 250N000009 HC RX 250: Performed by: ANESTHESIOLOGY

## 2025-07-13 RX ORDER — FENTANYL CITRATE 50 UG/ML
INJECTION, SOLUTION INTRAMUSCULAR; INTRAVENOUS PRN
Status: DISCONTINUED | OUTPATIENT
Start: 2025-07-13 | End: 2025-07-13

## 2025-07-13 RX ORDER — KETOROLAC TROMETHAMINE 30 MG/ML
INJECTION, SOLUTION INTRAMUSCULAR; INTRAVENOUS PRN
Status: DISCONTINUED | OUTPATIENT
Start: 2025-07-13 | End: 2025-07-13

## 2025-07-13 RX ORDER — LIDOCAINE HYDROCHLORIDE AND EPINEPHRINE BITARTRATE 20; .01 MG/ML; MG/ML
INJECTION, SOLUTION SUBCUTANEOUS PRN
Status: DISCONTINUED | OUTPATIENT
Start: 2025-07-13 | End: 2025-07-13

## 2025-07-13 RX ORDER — SODIUM CHLORIDE, SODIUM LACTATE, POTASSIUM CHLORIDE, CALCIUM CHLORIDE 600; 310; 30; 20 MG/100ML; MG/100ML; MG/100ML; MG/100ML
INJECTION, SOLUTION INTRAVENOUS CONTINUOUS PRN
Status: DISCONTINUED | OUTPATIENT
Start: 2025-07-13 | End: 2025-07-13

## 2025-07-13 RX ORDER — OXYTOCIN/0.9 % SODIUM CHLORIDE 30/500 ML
PLASTIC BAG, INJECTION (ML) INTRAVENOUS CONTINUOUS PRN
Status: DISCONTINUED | OUTPATIENT
Start: 2025-07-13 | End: 2025-07-13

## 2025-07-13 RX ORDER — MORPHINE SULFATE 1 MG/ML
INJECTION, SOLUTION EPIDURAL; INTRATHECAL; INTRAVENOUS PRN
Status: DISCONTINUED | OUTPATIENT
Start: 2025-07-13 | End: 2025-07-13

## 2025-07-13 RX ADMIN — Medication 500 ML/HR: at 09:24

## 2025-07-13 RX ADMIN — LIDOCAINE HYDROCHLORIDE AND EPINEPHRINE 3 ML: 20; 10 INJECTION, SOLUTION INFILTRATION; PERINEURAL at 09:02

## 2025-07-13 RX ADMIN — SODIUM CHLORIDE, SODIUM LACTATE, POTASSIUM CHLORIDE, AND CALCIUM CHLORIDE: .6; .31; .03; .02 INJECTION, SOLUTION INTRAVENOUS at 08:51

## 2025-07-13 RX ADMIN — Medication 2 MG: at 09:33

## 2025-07-13 RX ADMIN — KETOROLAC TROMETHAMINE 30 MG: 30 INJECTION, SOLUTION INTRAMUSCULAR at 09:33

## 2025-07-13 RX ADMIN — LIDOCAINE HYDROCHLORIDE AND EPINEPHRINE 3 ML: 20; 10 INJECTION, SOLUTION INFILTRATION; PERINEURAL at 09:19

## 2025-07-13 RX ADMIN — PHENYLEPHRINE HYDROCHLORIDE 0.5 MCG/KG/MIN: 10 INJECTION INTRAVENOUS at 09:01

## 2025-07-13 RX ADMIN — FENTANYL CITRATE 50 MCG: 50 INJECTION, SOLUTION INTRAMUSCULAR; INTRAVENOUS at 09:48

## 2025-07-13 RX ADMIN — LIDOCAINE HYDROCHLORIDE AND EPINEPHRINE 10 ML: 20; 10 INJECTION, SOLUTION INFILTRATION; PERINEURAL at 08:53

## 2025-07-13 RX ADMIN — Medication 3 G: at 09:03

## 2025-07-13 RX ADMIN — ONDANSETRON 4 MG: 2 INJECTION INTRAMUSCULAR; INTRAVENOUS at 09:03

## 2025-07-13 RX ADMIN — FENTANYL CITRATE 50 MCG: 50 INJECTION, SOLUTION INTRAMUSCULAR; INTRAVENOUS at 09:28

## 2025-07-13 NOTE — ANESTHESIA CARE TRANSFER NOTE
Patient: Mei Tello    Procedure: Procedure(s):   SECTION  Cervical Ripening    Diagnosis: Failure to progress in second stage of labor [O62.2]  Diagnosis Additional Information: No value filed.    Anesthesia Type:   Epidural     Note:    Oropharynx: oropharynx clear of all foreign objects  Level of Consciousness: awake  Oxygen Supplementation: room air    Independent Airway: airway patency satisfactory and stable  Dentition: dentition unchanged  Vital Signs Stable: post-procedure vital signs reviewed and stable  Report to RN Given: handoff report given  Patient transferred to: Labor and Delivery    Handoff Report: Identifed the Patient, Identified the Reponsible Provider, Reviewed the pertinent medical history, Discussed the surgical course, Reviewed Intra-OP anesthesia mangement and issues during anesthesia, Set expectations for post-procedure period and Allowed opportunity for questions and acknowledgement of understanding      Vitals:  Vitals Value Taken Time   /56 25 10:01   Temp 37.2  C (99  F) 25 10:01   Pulse 85    Resp 12    SpO2 96 % 25 10:04   Vitals shown include unfiled device data.    Electronically Signed By: ANGEL James CRNA  2025  10:06 AM

## 2025-07-13 NOTE — ANESTHESIA PREPROCEDURE EVALUATION
Anesthesia Pre-Procedure Evaluation    Patient: Mei Tello   MRN: [id number] : 1985          Procedure :   Cervical Ripening      Past Medical History:   Diagnosis Date    Cervical high risk HPV (human papillomavirus) test positive 2018, ,  NIL paps 18 NIL pap, +HR HPV (not 16/18) 19 NIL pap, +HR HPV (not 16/18) 21 NIL pap, +HR HPV (not 16/18). Plan: colposcopy due before 21 / await provider    Migraine headache     Pneumonia due to 2019 novel coronavirus 2020      Past Surgical History:   Procedure Laterality Date    CHOLECYSTECTOMY      ZZC LAP,CHOLECYSTECTOMY/EXPLORE      Description: Cholecystectomy Laparoscopic;  Recorded: 10/30/2008;      Allergies   Allergen Reactions    Sulfa Antibiotics       Social History     Tobacco Use    Smoking status: Never    Smokeless tobacco: Never   Substance Use Topics    Alcohol use: Not on file      Wt Readings from Last 1 Encounters:   07/10/25 121.1 kg (267 lb)        Anesthesia Evaluation        No history of anesthetic complications       ROS/MED HX  ENT/Pulmonary:  - neg pulmonary ROS     Neurologic:  - neg neurologic ROS     Cardiovascular:  - neg cardiovascular ROS     METS/Exercise Tolerance:     Hematologic:  - neg hematologic  ROS     Musculoskeletal:       GI/Hepatic:  - neg GI/hepatic ROS     Renal/Genitourinary:       Endo:     (+)               Obesity,   gestational diabetes,    Psychiatric/Substance Use:  - neg psychiatric ROS     Infectious Disease:       Malignancy:       Other:     (-) previous  and TOLAC candidate         Physical Exam  Airway  Mallampati: III  TM distance: > 3 FB  Neck ROM: full    Cardiovascular - normal exam   Dental - normal exam    Pulmonary - normal exam      Neurological   Other Findings       OUTSIDE LABS:  CBC:   Lab Results   Component Value Date    WBC 18.3 (H) 07/10/2025    WBC 16.4 (H) 2025    HGB 12.0 07/10/2025    HGB 12.0 2025    HCT 35.0  07/10/2025    HCT 35.7 04/25/2025     07/10/2025     04/25/2025     BMP:   Lab Results   Component Value Date     04/25/2024     05/18/2023    POTASSIUM 4.5 04/25/2024    POTASSIUM 5.0 05/18/2023    CHLORIDE 107 04/25/2024    CHLORIDE 103 05/18/2023    CO2 23 04/25/2024    CO2 24 05/18/2023    BUN 11.9 04/25/2024    BUN 12.7 05/18/2023    CR 0.62 04/25/2024    CR 0.61 05/18/2023     (H) 07/12/2025     (H) 07/12/2025     COAGS:   Lab Results   Component Value Date    INR 1.02 11/12/2020    FIBR 625 (H) 11/12/2020     POC:   Lab Results   Component Value Date    HCG Negative 06/12/2024     HEPATIC:   Lab Results   Component Value Date    ALBUMIN 4.5 04/25/2024    PROTTOTAL 7.4 04/25/2024    ALT 31 04/25/2024    AST 27 04/25/2024    ALKPHOS 96 04/25/2024    BILITOTAL 0.3 04/25/2024     OTHER:   Lab Results   Component Value Date    A1C 5.4 07/10/2025    REBECA 9.8 04/25/2024    MAG 2.1 05/18/2023    LIPASE 149 03/28/2015    TSH 3.01 06/05/2024    .0 (H) 11/12/2020       Anesthesia Plan    ASA Status:  3       Anesthesia Type: Epidural.        Consents    Anesthesia Plan(s) and associated risks, benefits, and realistic alternatives discussed. Questions answered and patient/representative(s) expressed understanding.     - Discussed:     - Discussed with:  Patient               Postoperative Care         Comments:               neg OB ROS.      Christine Jack MD    I have reviewed the pertinent notes and labs in the chart from the past 30 days and (re)examined the patient.  Any updates or changes from those notes are reflected in this note.    Clinically Significant Risk Factors

## 2025-07-13 NOTE — ANESTHESIA PROCEDURE NOTES
Epidural catheter Procedure Note    Pre-Procedure   Staff -        Anesthesiologist:  Christine Jack MD       Performed By: anesthesiologist       Location: OB       Procedure Start/Stop Times: 7/12/2025 8:16 PM and 7/12/2025 8:27 PM       Pre-Anesthestic Checklist: patient identified, IV checked, risks and benefits discussed, informed consent, monitors and equipment checked, pre-op evaluation, at physician/surgeon's request and post-op pain management  Timeout:       Correct Patient: Yes        Correct Procedure: Yes        Correct Site: Yes        Correct Position: Yes   Procedure Documentation  Procedure: epidural catheter         Patient Position: sitting       Patient Prep/Sterile Barriers: sterile gloves, mask, patient draped       Skin prep: Chloraprep       Local skin infiltrated with 3 mL of 1% lidocaine.        Insertion Site: L3-4. (midline approach).       Technique: LORT air        ЕКАТЕРИНА at 7 cm.       Needle Type: Touhy needle       Needle Gauge: 17.        Needle Length (Inches): 3.5        Catheter: 19 G.          Catheter threaded easily.           Threaded 15 cm at skin.         # of attempts: 1 and  # of redirects:  0    Assessment/Narrative         Paresthesias: No.       Test dose of 3 mL lidocaine 1.5% w/ 1:200,000 epinephrine at 20:21 CDT.         Test dose negative, 3 minutes after injection, for signs of intravascular, subdural, or intrathecal injection.       Insertion/Infusion Method: LORT air       Aspiration negative for Heme or CSF via Epidural Catheter.    Medication(s) Administered   5 mL BUPivacaine HCl (PF) 0.25 %  Lidocaine 2% (Epidural) - EPIDURAL   5 mL - 7/12/2025 8:24:00 PM  Medication Administration Time: 7/12/2025 8:16 PM     Comments:  Epi   R/b/a discussed, patient agrees to have an epidural catheter placement.   Local infiltration of skin, advancement of needle without paresthesias, excellent Екатерина to air.   Catheter advanced without resistance nor paresthesias, negative  "aspiration for heme or CSF.  .  Patient tolerated the procedure well, all questions answered.        FOR [facility] ONLY:   Pain Team Contact information: please page the Pain Team [address] [employer]. Search \"Pain\". During daytime hours, please page the attending first. At night please page the resident first.      "

## 2025-07-14 LAB
GLUCOSE BLDC GLUCOMTR-MCNC: 103 MG/DL (ref 70–99)
HGB BLD-MCNC: 9.8 G/DL (ref 11.7–15.7)
MCV RBC AUTO: 86 FL (ref 78–100)

## 2025-07-15 PROBLEM — O13.9 GESTATIONAL HYPERTENSION: Status: ACTIVE | Noted: 2025-07-15

## 2025-07-15 LAB — GLUCOSE BLDC GLUCOMTR-MCNC: 97 MG/DL (ref 70–99)

## 2025-07-20 NOTE — ANESTHESIA POSTPROCEDURE EVALUATION
Patient: Mei Tello    Procedure: Procedure(s):  PRIMARY  SECTION  Cervical Ripening    Anesthesia Type:  Epidural    Note:  Disposition: Inpatient   Postop Pain Control: Uneventful            Sign Out: Well controlled pain   PONV: No   Neuro/Psych: Uneventful            Sign Out: Acceptable/Baseline neuro status   Airway/Respiratory: Uneventful            Sign Out: Acceptable/Baseline resp. status   CV/Hemodynamics:    Other NRE: NONE   DID A NON-ROUTINE EVENT OCCUR? No     Epidural-to- Updated ASA: 2 Emergent      Last vitals:  Vitals:    07/15/25 0811 07/15/25 1300 07/15/25 1732   BP: 132/69 (!) 140/66 134/65   Pulse: 84 90    Resp: 16 16    Temp: 36.8  C (98.2  F) 36.8  C (98.2  F) 36.8  C (98.2  F)   SpO2: 98% 98% 97%       Electronically Signed By: Christine Jack MD  2025  6:58 PM

## 2025-07-29 ENCOUNTER — TELEPHONE (OUTPATIENT)
Dept: OBGYN | Facility: CLINIC | Age: 40
End: 2025-07-29
Payer: COMMERCIAL

## 2025-07-29 NOTE — TELEPHONE ENCOUNTER
[facility] Call Center    Phone Message    May a detailed message be left on voicemail: yes     Reason for Call: Patient is calling to get her and her baby in for lactation in [place]. Please reach out. Thank you!    Action Taken: Message routed to:  Other: [place] [facility]    Travel Screening: Not Applicable     Date of Service:

## 2025-07-29 NOTE — TELEPHONE ENCOUNTER
Scheduled for lactation under newborns chart for next Friday in [place] with Naima Mo.     Frances Reese, MAGALIE on 7/29/2025 at 9:20 AM

## 2025-08-20 ENCOUNTER — TRANSFERRED RECORDS (OUTPATIENT)
Dept: HEALTH INFORMATION MANAGEMENT | Facility: CLINIC | Age: 40
End: 2025-08-20
Payer: COMMERCIAL